# Patient Record
Sex: FEMALE | URBAN - METROPOLITAN AREA
[De-identification: names, ages, dates, MRNs, and addresses within clinical notes are randomized per-mention and may not be internally consistent; named-entity substitution may affect disease eponyms.]

---

## 2024-03-10 ENCOUNTER — APPOINTMENT (OUTPATIENT)
Dept: RADIOLOGY | Facility: MEDICAL CENTER | Age: 89
DRG: 065 | End: 2024-03-10
Attending: EMERGENCY MEDICINE
Payer: MEDICARE

## 2024-03-10 ENCOUNTER — HOSPITAL ENCOUNTER (INPATIENT)
Facility: MEDICAL CENTER | Age: 89
LOS: 5 days | DRG: 065 | End: 2024-03-15
Attending: EMERGENCY MEDICINE | Admitting: HOSPITALIST
Payer: MEDICARE

## 2024-03-10 DIAGNOSIS — R41.82 ALTERED MENTAL STATUS, UNSPECIFIED ALTERED MENTAL STATUS TYPE: ICD-10-CM

## 2024-03-10 DIAGNOSIS — I63.9 ISCHEMIC STROKE (HCC): ICD-10-CM

## 2024-03-10 DIAGNOSIS — R29.90 STROKE-LIKE SYMPTOMS: ICD-10-CM

## 2024-03-10 DIAGNOSIS — R53.1 RIGHT SIDED WEAKNESS: ICD-10-CM

## 2024-03-10 PROBLEM — E87.6 HYPOKALEMIA: Status: ACTIVE | Noted: 2024-03-10

## 2024-03-10 LAB
ABO + RH BLD: NORMAL
ABO GROUP BLD: ABNORMAL
ALBUMIN SERPL BCP-MCNC: 3.7 G/DL (ref 3.2–4.9)
ALBUMIN/GLOB SERPL: 1.5 G/DL
ALP SERPL-CCNC: 89 U/L (ref 30–99)
ALT SERPL-CCNC: 8 U/L (ref 2–50)
ANION GAP SERPL CALC-SCNC: 12 MMOL/L (ref 7–16)
APTT PPP: 26.6 SEC (ref 24.7–36)
AST SERPL-CCNC: 15 U/L (ref 12–45)
BASOPHILS # BLD AUTO: 0.7 % (ref 0–1.8)
BASOPHILS # BLD: 0.04 K/UL (ref 0–0.12)
BILIRUB SERPL-MCNC: 0.5 MG/DL (ref 0.1–1.5)
BLD GP AB INVEST PLASRBC-IMP: ABNORMAL
BLD GP AB SCN SERPL QL: ABNORMAL
BUN SERPL-MCNC: 14 MG/DL (ref 8–22)
CALCIUM ALBUM COR SERPL-MCNC: 8.1 MG/DL (ref 8.5–10.5)
CALCIUM SERPL-MCNC: 7.9 MG/DL (ref 8.5–10.5)
CHLORIDE SERPL-SCNC: 108 MMOL/L (ref 96–112)
CO2 SERPL-SCNC: 21 MMOL/L (ref 20–33)
CREAT SERPL-MCNC: 0.8 MG/DL (ref 0.5–1.4)
EOSINOPHIL # BLD AUTO: 0.12 K/UL (ref 0–0.51)
EOSINOPHIL NFR BLD: 2.2 % (ref 0–6.9)
ERYTHROCYTE [DISTWIDTH] IN BLOOD BY AUTOMATED COUNT: 43.4 FL (ref 35.9–50)
EST. AVERAGE GLUCOSE BLD GHB EST-MCNC: 108 MG/DL
GFR SERPLBLD CREATININE-BSD FMLA CKD-EPI: 70 ML/MIN/1.73 M 2
GLOBULIN SER CALC-MCNC: 2.5 G/DL (ref 1.9–3.5)
GLUCOSE SERPL-MCNC: 107 MG/DL (ref 65–99)
HBA1C MFR BLD: 5.4 % (ref 4–5.6)
HCT VFR BLD AUTO: 37.3 % (ref 37–47)
HGB BLD-MCNC: 13.5 G/DL (ref 12–16)
IMM GRANULOCYTES # BLD AUTO: 0.01 K/UL (ref 0–0.11)
IMM GRANULOCYTES NFR BLD AUTO: 0.2 % (ref 0–0.9)
INR PPP: 0.96 (ref 0.87–1.13)
LYMPHOCYTES # BLD AUTO: 1.02 K/UL (ref 1–4.8)
LYMPHOCYTES NFR BLD: 18.7 % (ref 22–41)
MCH RBC QN AUTO: 33.3 PG (ref 27–33)
MCHC RBC AUTO-ENTMCNC: 36.2 G/DL (ref 32.2–35.5)
MCV RBC AUTO: 92.1 FL (ref 81.4–97.8)
MONOCYTES # BLD AUTO: 0.37 K/UL (ref 0–0.85)
MONOCYTES NFR BLD AUTO: 6.8 % (ref 0–13.4)
NEUTROPHILS # BLD AUTO: 3.9 K/UL (ref 1.82–7.42)
NEUTROPHILS NFR BLD: 71.4 % (ref 44–72)
NRBC # BLD AUTO: 0 K/UL
NRBC BLD-RTO: 0 /100 WBC (ref 0–0.2)
PLATELET # BLD AUTO: 247 K/UL (ref 164–446)
PMV BLD AUTO: 8.4 FL (ref 9–12.9)
POTASSIUM SERPL-SCNC: 3.2 MMOL/L (ref 3.6–5.5)
PROT SERPL-MCNC: 6.2 G/DL (ref 6–8.2)
PROTHROMBIN TIME: 12.9 SEC (ref 12–14.6)
RBC # BLD AUTO: 4.05 M/UL (ref 4.2–5.4)
RH BLD: ABNORMAL
SODIUM SERPL-SCNC: 141 MMOL/L (ref 135–145)
TROPONIN T SERPL-MCNC: 13 NG/L (ref 6–19)
WBC # BLD AUTO: 5.5 K/UL (ref 4.8–10.8)

## 2024-03-10 PROCEDURE — 99223 1ST HOSP IP/OBS HIGH 75: CPT | Mod: AI | Performed by: HOSPITALIST

## 2024-03-10 PROCEDURE — 700105 HCHG RX REV CODE 258: Performed by: HOSPITALIST

## 2024-03-10 PROCEDURE — 86870 RBC ANTIBODY IDENTIFICATION: CPT

## 2024-03-10 PROCEDURE — 93005 ELECTROCARDIOGRAM TRACING: CPT | Performed by: EMERGENCY MEDICINE

## 2024-03-10 PROCEDURE — 85610 PROTHROMBIN TIME: CPT

## 2024-03-10 PROCEDURE — 94760 N-INVAS EAR/PLS OXIMETRY 1: CPT

## 2024-03-10 PROCEDURE — 71045 X-RAY EXAM CHEST 1 VIEW: CPT

## 2024-03-10 PROCEDURE — 36415 COLL VENOUS BLD VENIPUNCTURE: CPT

## 2024-03-10 PROCEDURE — 99285 EMERGENCY DEPT VISIT HI MDM: CPT

## 2024-03-10 PROCEDURE — 86900 BLOOD TYPING SEROLOGIC ABO: CPT

## 2024-03-10 PROCEDURE — 85025 COMPLETE CBC W/AUTO DIFF WBC: CPT

## 2024-03-10 PROCEDURE — 700117 HCHG RX CONTRAST REV CODE 255: Performed by: EMERGENCY MEDICINE

## 2024-03-10 PROCEDURE — 70498 CT ANGIOGRAPHY NECK: CPT

## 2024-03-10 PROCEDURE — 86850 RBC ANTIBODY SCREEN: CPT

## 2024-03-10 PROCEDURE — 0042T CT-CEREBRAL PERFUSION ANALYSIS: CPT

## 2024-03-10 PROCEDURE — 80053 COMPREHEN METABOLIC PANEL: CPT

## 2024-03-10 PROCEDURE — 86901 BLOOD TYPING SEROLOGIC RH(D): CPT

## 2024-03-10 PROCEDURE — 85730 THROMBOPLASTIN TIME PARTIAL: CPT

## 2024-03-10 PROCEDURE — 700111 HCHG RX REV CODE 636 W/ 250 OVERRIDE (IP): Performed by: HOSPITALIST

## 2024-03-10 PROCEDURE — 84484 ASSAY OF TROPONIN QUANT: CPT

## 2024-03-10 PROCEDURE — 70450 CT HEAD/BRAIN W/O DYE: CPT

## 2024-03-10 PROCEDURE — 70496 CT ANGIOGRAPHY HEAD: CPT

## 2024-03-10 PROCEDURE — 770020 HCHG ROOM/CARE - TELE (206)

## 2024-03-10 PROCEDURE — 83036 HEMOGLOBIN GLYCOSYLATED A1C: CPT

## 2024-03-10 RX ORDER — ATORVASTATIN CALCIUM 40 MG/1
40 TABLET, FILM COATED ORAL EVERY EVENING
Status: DISCONTINUED | OUTPATIENT
Start: 2024-03-10 | End: 2024-03-15 | Stop reason: HOSPADM

## 2024-03-10 RX ORDER — ACETAMINOPHEN 325 MG/1
650 TABLET ORAL EVERY 6 HOURS PRN
Status: DISCONTINUED | OUTPATIENT
Start: 2024-03-10 | End: 2024-03-15 | Stop reason: HOSPADM

## 2024-03-10 RX ADMIN — POTASSIUM CHLORIDE: 2 INJECTION, SOLUTION, CONCENTRATE INTRAVENOUS at 15:38

## 2024-03-10 RX ADMIN — IOHEXOL 80 ML: 350 INJECTION, SOLUTION INTRAVENOUS at 12:05

## 2024-03-10 RX ADMIN — IOHEXOL 40 ML: 350 INJECTION, SOLUTION INTRAVENOUS at 12:05

## 2024-03-10 ASSESSMENT — PAIN DESCRIPTION - PAIN TYPE
TYPE: ACUTE PAIN
TYPE: ACUTE PAIN

## 2024-03-10 NOTE — ED TRIAGE NOTES
Keyla Sanchez  89 y.o.  female  Chief Complaint   Patient presents with    Possible Stroke     Last known well at 8PM last night when her daughter checked on her. No thinners. . Pt's daughter came to see her at 9AM & found pt in bed, not responding normally verbally, weak on R side. Pt is baseline A & O x4, ambulatory, independent. Initial NIH 7.     Pt BIB EMS from home. Hx stroke with L eye visual changes as only deficit. Direct to CT, Stroke IR protocol per ERP.

## 2024-03-10 NOTE — ASSESSMENT & PLAN NOTE
With right hemiparesis and no evidence of seizure to suggest Librado's Paralysis  CT, CTA, perfusion study negative  MRI brain ordered.  Refrain from antiplatelets given the risk of hemorrhagic conversion  Therapies ordered  Tele monitoring to eval for afib  Await a call back from her daughter to discuss history and advanced directives and tube feeding.  Neurology consult once her daughter has been contacted to discuss her wishes.  Prognosis is poor given the significant deficits and advanced age.    MRI of the brain is reviewed, discussed with radiology discussed with neurology team, had discussion with patient's family regarding imaging and prognosis, neurologist recommending aspirin and Plavix and statin, patient unfortunately is not safe to swallow, discussed with patient's family regarding tube feeding, as per patient's daughter she is going to call rest of the family and discuss case they are considering comfort care/hospice.      03/13/24  Patient's family presenting with hospice for home versus group home.  Continue to be n.p.o. for ice chips  Discussed the risk of aspiration with meeting with the patient's family at bedside.  Once proceeding with comfort care measures and hospice, we will allow the patient to eat in anticipation that the patient will aspirate.  Speech therapy can recommend diet to minimize this risk.    03/14/24  Diet advanced following F EES study.

## 2024-03-10 NOTE — ED PROVIDER NOTES
"Emergency Physician Note    Chief Concern:  Chief Complaint   Patient presents with    Possible Stroke     Last known well at 8PM last night when her daughter checked on her. No thinners. . Pt's daughter came to see her at 9AM & found pt in bed, not responding normally verbally, weak on R side. Pt is baseline A & O x4, ambulatory, independent. Initial NIH 6.         Limitation to History:  Altered mental status    HPI/ROS   Outside Historians:   Transporting paramedics provide full history     External Records Reviewed:   No prior medical records available for review    HPI:  Keyla Sanchez is a 89 y.o. female who presents to the emergency department today for a stroke assessment.  I was called to evaluate the patient at triage out of concern for stroke.  Transporting paramedics report that she is 89 years old, lives independently alone at home with family checking on her twice a day.  She was last seen normal around 8 PM, 15 hours prior to arrival.  When family stopped by to check on her today they found she was still in bed which is unusual for her.  She was not able to stand, and appeared to be weak and less responsive than usual.  Paramedics report that she does have right-sided weakness which is not present at baseline, and may have some dysarthria.  She has a prior history of CVA about a year ago, as reported by paramedics, only residual deficit is related to visual fields.    PAST MEDICAL HISTORY  No past medical history on file.    SURGICAL HISTORY  No past surgical history on file.    FAMILY HISTORY  No family history on file.    SOCIAL HISTORY       CURRENT MEDICATIONS  Previous Medications    No medications on file       ALLERGIES  Patient has no allergy information on record.    PHYSICAL EXAM  Vital Signs: BP (!) 201/115   Pulse 90   Temp 36.1 °C (97 °F) (Temporal)   Resp 16   Ht 1.651 m (5' 5\")   Wt 59 kg (130 lb)   SpO2 96%   BMI 21.63 kg/m²   Constitutional: Alert, no acute distress  HENT: " Normocephalic, atraumatic, no facial abrasions, no facial edema  Cardiovascular: No tachycardia, regular rhythm  Pulmonary: No respiratory distress, normal work of breathing, breath sounds quiet and equal bilaterally  Abdomen: Soft, non tender, no peritoneal signs.  Skin: Warm, dry, no rashes or lesions  Musculoskeletal: Normal range of motion in all extremities, no swelling or deformity noted  Neurologic: No facial asymmetry, very minimally slurred speech, easily follows commands, able to name objects, is uncertain of the month, but knows her age, muscle strength 5/5 in left upper and left lower extremities, 3/5 in right upper extremity and right lower extremity, normal  strength bilaterally, sensation grossly intact, finger-to-nose testing limited by right upper extremity weakness, visual field testing limited by residual visual field deficit from prior CVA NIHSS: 7    Diagnostic Studies & Procedures    Labs:  All labs reviewed by me as noted below.    EK Lead EKG interpreted by me as noted below  Results for orders placed or performed during the hospital encounter of 03/10/24   EKG (NOW)   Result Value Ref Range    Report       [facility] Emergency Dept.    Test Date:  2024-03-10  Pt Name:    PEARL SARMIENTO                Department: ER  MRN:        [id number]                      Room:       S1  Gender:     F                            Technician: [id number]  :        [id number]                   Requested By:ER TRIAGE PROTOCOL  Order #:    [id number]                    Reading MD: RADHA CHRISTY MD    Measurements  Intervals                                Axis  Rate:       86                           P:          -1  CO:         160                          QRS:        -33  QRSD:       90                           T:          101  QT:         405  QTc:        485    Interpretive Statements  Rate 86, sinus rhythm, no ST elevation or depression, no pathologic T wave  inversions, normal  intervals  Electronically Signed On 03- 13:24:24 PDT by RADHA CHRISTY MD         Radiology:  The attending Emergency Physician has independently interpreted the following imaging:  I independently reviewed the CT imaging of the head, no large acute bleed identified.    CT-CTA NECK WITH & W/O-POST PROCESSING   Final Result      1.  Atherosclerotic plaque involving the carotid bifurcations bilaterally. This results in less than 50% diameter narrowing.      2.  Ectasia of the visualized intracranial portions of the vertebral, basilar and internal carotid arteries.      3.  No evidence of carotid or vertebral arterial occlusion or dissection.      CT-CTA HEAD WITH & W/O-POST PROCESS   Final Result      1.  No acute appearing abnormality detected. No large vessel occlusion detected.   2.  Atherosclerotic vascular disease of intracranial arteries which is detailed above. This is most notable for moderate stenoses in the right anterior cerebral artery.   3.  There is fusiform dilation of the left carotid terminus up to 6 mm in diameter although there is no saccular aneurysm detected.      CT-CEREBRAL PERFUSION ANALYSIS   Final Result      No cerebral perfusion anomaly detected.      1.  Cerebral blood flow less than 30% likely representing completed infarct = 0 mL.      2.  T Max more than 6 seconds likely representing combination of completed infarct and ischemia = 0 mL.      3.  Mismatched volume likely representing ischemic brain/penumbra = None      4.  Please note that the cerebral perfusion was performed on the limited brain tissue around the basal ganglia region. Infarct/ischemia outside the CT perfusion sections can be missed in this study.      CT-HEAD W/O   Final Result      1.  No evidence of acute intracranial process.      2.  Cerebral atrophy as well as periventricular chronic small vessel ischemic change.         DX-CHEST-PORTABLE (1 VIEW)    (Results Pending)       Course and Medical Decision  Making    Initial Assessment and Plan:  Ms. Sanchez presents to the emergency department today as a stroke assessment, she has profound right-sided weakness.  On arrival NIH stroke scale was 7.  Last known well time was last night, outside of the window for alteplase, for this reason there was no stroke activation called on the patient's arrival.  She was sent for expedited imaging which did not show a large vessel occlusion amenable to thrombectomy, no indication for escalation to stroke IR team.    Screening lab work does not show any acute abnormalities, she is mildly hypertensive on arrival to the emergency department.  Glucose is within normal limits, no evidence of hypoglycemia.    She is admitted to hospitalist service for further evaluation including MRI.  At this time, she does not have family in the emergency department to provide further history, or discuss goals of care.    Additional Problems and Disposition    I have discussed management of the patient with the following physicians:   Dr. Lamar. Hospitalist    Disposition:  Admit in guarded condition    FINAL IMPRESSION   1. Right sided weakness    2. Altered mental status, unspecified altered mental status type    3. Stroke-like symptoms

## 2024-03-10 NOTE — H&P
Hospital Medicine History & Physical Note    Date of Service  3/10/2024    Primary Care Physician  No primary care provider on file.    Consultants  none    Code Status  Full Code    Chief Complaint  Chief Complaint   Patient presents with    Possible Stroke     Last known well at 8PM last night when her daughter checked on her. No thinners. . Pt's daughter came to see her at 9AM & found pt in bed, not responding normally verbally, weak on R side. Pt is baseline A & O x4, ambulatory, independent. Initial NIH 6.       History of Presenting Illness  Keyla Sanchez is a 89 y.o. female who presented 3/10/2024 with right sided paralysis.  Ms. Sanchez apparently has a past medical history of prior stroke and hypertension though details are unknown that was found by her daughter this morning with decreased level of consciousness and right-sided weakness.  Her last known well was 8 PM the night prior.  She is brought to the emergency room where CT of the head, CTA of the head and neck and CT perfusion are all negative for acute processes.  She is verbal though oriented only to name though not age nor time nor event.  She has a significant right hemiparesis.  Her blood pressure is elevated in the emergency room at 180/128.  She does not appear safe to swallow at this time and a high aspiration risk.  There are no friends nor family to corroborate her history present illness nor past medical history nor advanced directives.    I discussed the plan of care with Dr. Felder.  I have called her daughter Radha Rajan [phone] and left a message with her to call back.    Review of Systems  Review of Systems   Unable to perform ROS: Acuity of condition       Past Medical History  This cannot be obtained but apparently she has a history of a prior stroke a year ago and hypertension this is via a small note that was left in the emergency room otherwise we have no records    Surgical History  This cannot be obtained    Family  "History  This cannot be obtained    Social History   Reportedly she lives alone otherwise details are unknown    Allergies  No Known Allergies    Medications  Home medications are unknown       Physical Exam  Temp:  [36.1 °C (97 °F)] 36.1 °C (97 °F)  Pulse:  [84-90] 84  Resp:  [16] 16  BP: (180-201)/(101-128) 180/128  SpO2:  [95 %-96 %] 95 %  Blood Pressure : (!) 180/128   Temperature: 36.1 °C (97 °F)   Pulse: 84   Respiration: 16   Pulse Oximetry: 95 %       Physical Exam  Vitals and nursing note reviewed.   Constitutional:       Appearance: She is ill-appearing.   Cardiovascular:      Rate and Rhythm: Normal rate and regular rhythm.   Pulmonary:      Effort: Pulmonary effort is normal.      Breath sounds: Normal breath sounds.   Abdominal:      General: There is no distension.      Tenderness: There is no abdominal tenderness.   Musculoskeletal:      Right lower leg: No edema.      Left lower leg: No edema.   Neurological:      Mental Status: She is alert.      Comments: Mild right facial droop, she has slurred speech though it is intelligible, she knows her name but does not know how old she is does not know where we are does not know the year  Dense right arm paralysis  She has some movement in the right quad but no movement in the right leg below the knee         Laboratory:  Recent Labs     03/10/24  1155   WBC 5.5   RBC 4.05*   HEMOGLOBIN 13.5   HEMATOCRIT 37.3   MCV 92.1   MCH 33.3*   MCHC 36.2*   RDW 43.4   PLATELETCT 247   MPV 8.4*     Recent Labs     03/10/24  1155   SODIUM 141   POTASSIUM 3.2*   CHLORIDE 108   CO2 21   GLUCOSE 107*   BUN 14   CREATININE 0.80   CALCIUM 7.9*     Recent Labs     03/10/24  1155   ALTSGPT 8   ASTSGOT 15   ALKPHOSPHAT 89   TBILIRUBIN 0.5   GLUCOSE 107*     Recent Labs     03/10/24  1155   APTT 26.6   INR 0.96     No results for input(s): \"NTPROBNP\" in the last 72 hours.      Recent Labs     03/10/24  1155   TROPONINT 13       Imaging:  DX-CHEST-PORTABLE (1 VIEW)   Final Result "      Cardiomegaly.      CT-CTA NECK WITH & W/O-POST PROCESSING   Final Result      1.  Atherosclerotic plaque involving the carotid bifurcations bilaterally. This results in less than 50% diameter narrowing.      2.  Ectasia of the visualized intracranial portions of the vertebral, basilar and internal carotid arteries.      3.  No evidence of carotid or vertebral arterial occlusion or dissection.      CT-CTA HEAD WITH & W/O-POST PROCESS   Final Result      1.  No acute appearing abnormality detected. No large vessel occlusion detected.   2.  Atherosclerotic vascular disease of intracranial arteries which is detailed above. This is most notable for moderate stenoses in the right anterior cerebral artery.   3.  There is fusiform dilation of the left carotid terminus up to 6 mm in diameter although there is no saccular aneurysm detected.      CT-CEREBRAL PERFUSION ANALYSIS   Final Result      No cerebral perfusion anomaly detected.      1.  Cerebral blood flow less than 30% likely representing completed infarct = 0 mL.      2.  T Max more than 6 seconds likely representing combination of completed infarct and ischemia = 0 mL.      3.  Mismatched volume likely representing ischemic brain/penumbra = None      4.  Please note that the cerebral perfusion was performed on the limited brain tissue around the basal ganglia region. Infarct/ischemia outside the CT perfusion sections can be missed in this study.      CT-HEAD W/O   Final Result      1.  No evidence of acute intracranial process.      2.  Cerebral atrophy as well as periventricular chronic small vessel ischemic change.         MR-BRAIN-W/O    (Results Pending)     EKG interpreted by me sinus rhythm Q waves anteriorly normal QT interval      Assessment/Plan:  Justification for Admission Status  I anticipate this patient will require at least two midnights for appropriate medical management, necessitating inpatient admission because workup and treatment of dense  ischemic stroke with hemiparesis    Patient will need a Telemetry bed on MEDICAL service .  The need is secondary to evaluate for occult atrial fibrillation.    * Ischemic stroke (HCC)- (present on admission)  Assessment & Plan  With right hemiparesis and no evidence of seizure to suggest Librado's Paralysis  CT, CTA, perfusion study negative  MRI brain ordered.  Refrain from antiplatelets given the risk of hemorrhagic conversion  Therapies ordered  Tele monitoring to eval for afib  Await a call back from her daughter to discuss history and advanced directives and tube feeding.  Neurology consult once her daughter has been contacted to discuss her wishes.  Prognosis is poor given the significant deficits and advanced age.        Hypokalemia- (present on admission)  Assessment & Plan  Potassium is low at 3.2 and will be added to IV fluids  Check BMP in the morning        VTE prophylaxis: SCDs/TEDs

## 2024-03-10 NOTE — ED NOTES
Bedside report received from off going RN/tech: Lisa, assumed care of patient.  POC discussed with patient. Call light within reach, all needs addressed at this time.       Fall risk interventions in place: Place socks on patient, Keep floor surfaces clean and dry, and Bed Alarm in use (all applicable per [place] Fall risk assessment)   Continuous monitoring: Cardiac Leads, Pulse Ox, or Blood Pressure  IVF/IV medications: Not Applicable   Oxygen: Room Air  Bedside sitter: Not Applicable   Isolation: Not Applicable

## 2024-03-10 NOTE — ASSESSMENT & PLAN NOTE
Potassium is low at 3.2 and will be added to IV fluids  Check BMP in the morning    03/13/24  Continuing potassium infusion    03/14/24  Discontinue potassium supplementation

## 2024-03-11 ENCOUNTER — APPOINTMENT (OUTPATIENT)
Dept: RADIOLOGY | Facility: MEDICAL CENTER | Age: 89
DRG: 065 | End: 2024-03-11
Attending: HOSPITALIST
Payer: MEDICARE

## 2024-03-11 ENCOUNTER — HOSPICE ADMISSION (OUTPATIENT)
Dept: HOSPICE | Facility: HOSPICE | Age: 89
End: 2024-03-11
Payer: MEDICARE

## 2024-03-11 PROBLEM — I16.0 HYPERTENSIVE URGENCY: Status: ACTIVE | Noted: 2024-03-11

## 2024-03-11 LAB
CHOLEST SERPL-MCNC: 253 MG/DL (ref 100–199)
EKG IMPRESSION: NORMAL
GLUCOSE BLD STRIP.AUTO-MCNC: 120 MG/DL (ref 65–99)
HDLC SERPL-MCNC: 67 MG/DL
LDLC SERPL CALC-MCNC: 156 MG/DL
TRIGL SERPL-MCNC: 148 MG/DL (ref 0–149)

## 2024-03-11 PROCEDURE — 70551 MRI BRAIN STEM W/O DYE: CPT

## 2024-03-11 PROCEDURE — 36415 COLL VENOUS BLD VENIPUNCTURE: CPT

## 2024-03-11 PROCEDURE — 80061 LIPID PANEL: CPT

## 2024-03-11 PROCEDURE — 92610 EVALUATE SWALLOWING FUNCTION: CPT

## 2024-03-11 PROCEDURE — G0316 PR PROLONGED IP/OBS E&M EA 15 MIN: HCPCS | Performed by: HOSPITALIST

## 2024-03-11 PROCEDURE — A9270 NON-COVERED ITEM OR SERVICE: HCPCS | Performed by: HOSPITALIST

## 2024-03-11 PROCEDURE — 700105 HCHG RX REV CODE 258: Performed by: HOSPITALIST

## 2024-03-11 PROCEDURE — 770020 HCHG ROOM/CARE - TELE (206)

## 2024-03-11 PROCEDURE — 700102 HCHG RX REV CODE 250 W/ 637 OVERRIDE(OP): Performed by: HOSPITALIST

## 2024-03-11 PROCEDURE — 97602 WOUND(S) CARE NON-SELECTIVE: CPT

## 2024-03-11 PROCEDURE — 82962 GLUCOSE BLOOD TEST: CPT

## 2024-03-11 PROCEDURE — 99223 1ST HOSP IP/OBS HIGH 75: CPT | Performed by: NURSE PRACTITIONER

## 2024-03-11 PROCEDURE — 99222 1ST HOSP IP/OBS MODERATE 55: CPT | Performed by: PHYSICAL MEDICINE & REHABILITATION

## 2024-03-11 PROCEDURE — 700111 HCHG RX REV CODE 636 W/ 250 OVERRIDE (IP): Performed by: HOSPITALIST

## 2024-03-11 PROCEDURE — 99233 SBSQ HOSP IP/OBS HIGH 50: CPT | Mod: 25 | Performed by: HOSPITALIST

## 2024-03-11 RX ORDER — LABETALOL HYDROCHLORIDE 5 MG/ML
10 INJECTION, SOLUTION INTRAVENOUS EVERY 6 HOURS PRN
Status: DISCONTINUED | OUTPATIENT
Start: 2024-03-11 | End: 2024-03-15 | Stop reason: HOSPADM

## 2024-03-11 RX ORDER — AMLODIPINE BESYLATE 5 MG/1
5 TABLET ORAL
Status: DISCONTINUED | OUTPATIENT
Start: 2024-03-11 | End: 2024-03-11

## 2024-03-11 RX ORDER — ASPIRIN 300 MG/1
300 SUPPOSITORY RECTAL DAILY
Status: DISCONTINUED | OUTPATIENT
Start: 2024-03-11 | End: 2024-03-15 | Stop reason: HOSPADM

## 2024-03-11 RX ORDER — HYDRALAZINE HYDROCHLORIDE 20 MG/ML
10 INJECTION INTRAMUSCULAR; INTRAVENOUS EVERY 6 HOURS PRN
Status: DISCONTINUED | OUTPATIENT
Start: 2024-03-11 | End: 2024-03-15 | Stop reason: HOSPADM

## 2024-03-11 RX ORDER — AMLODIPINE BESYLATE 10 MG/1
10 TABLET ORAL DAILY
COMMUNITY
End: 2024-03-16

## 2024-03-11 RX ADMIN — POTASSIUM CHLORIDE: 2 INJECTION, SOLUTION, CONCENTRATE INTRAVENOUS at 02:47

## 2024-03-11 RX ADMIN — HYDRALAZINE HYDROCHLORIDE 10 MG: 20 INJECTION, SOLUTION INTRAMUSCULAR; INTRAVENOUS at 19:26

## 2024-03-11 RX ADMIN — POTASSIUM CHLORIDE: 2 INJECTION, SOLUTION, CONCENTRATE INTRAVENOUS at 17:21

## 2024-03-11 RX ADMIN — LABETALOL HYDROCHLORIDE 10 MG: 5 INJECTION INTRAVENOUS at 14:53

## 2024-03-11 RX ADMIN — HYDRALAZINE HYDROCHLORIDE 10 MG: 20 INJECTION, SOLUTION INTRAMUSCULAR; INTRAVENOUS at 14:06

## 2024-03-11 RX ADMIN — ASPIRIN 300 MG: 300 SUPPOSITORY RECTAL at 09:15

## 2024-03-11 RX ADMIN — LABETALOL HYDROCHLORIDE 10 MG: 5 INJECTION INTRAVENOUS at 20:54

## 2024-03-11 ASSESSMENT — PAIN DESCRIPTION - PAIN TYPE
TYPE: ACUTE PAIN
TYPE: ACUTE PAIN

## 2024-03-11 ASSESSMENT — FIBROSIS 4 INDEX: FIB4 SCORE: 1.95

## 2024-03-11 NOTE — PROGRESS NOTES
1200 neuro check: At first pt. is difficult to arouse and when pt. aroused she was unable to verbalize-weakening in left arm and leg noted. MD notified. Rapid response not called d/t poor patient prognosis; palliative consult is pending.  Will continue to monitor.

## 2024-03-11 NOTE — PROGRESS NOTES
Monitor summary: SR 79-97, WI -0.17, QRS -0.08, QT -0.36, with rare PACs and PVCs per strip from the monitor room.

## 2024-03-11 NOTE — THERAPY
Physical Therapy Contact Note    Patient Name: Keyla Sanchez  Age:  89 y.o., Sex:  female  Medical Record #: [id number]  Today's Date: 3/11/2024       03/11/24 0844   Interdisciplinary Plan of Care Collaboration   IDT Collaboration with  Nursing   Collaboration Comments PT consult received. Attempted to see pt for PT evaluation, however RN was in process of calling Rapid Response due to new neurological symptoms. Will hold and attempt when appropriate and able.   Session Information   Date / Session Number  3/11- needs SAMIRAL

## 2024-03-11 NOTE — ASSESSMENT & PLAN NOTE
Discussed with neuro no indication for permissive htn  I have ordered iv hydralazine and labetalol, monitoring for side effects like hypotension bradycardia  Discussed with bedside nurse.     Patient is still requiring iv labetalol and hydralazine to control her hypertension, bp 202/118  Monitoring for side effects including but not limited to hypotension / bradycardia.     03/14/24  Continuing elevation in blood pressures.  Resume amlodipine 5 mg daily

## 2024-03-11 NOTE — CONSULTS
Physical Medicine and Rehabilitation Consultation          Date of initial consultation: 3/11/2024  Consulting provider: Brenden Lamar M.D.   Reason for consultation: assess for acute inpatient rehab appropriateness  LOS: 1 Day(s)    Chief complaint: Stroke     HPI: The patient is a 89 y.o. right hand dominant female with a past medical history of hypertension, hyperlipidemia, prior CVA;  who presented on 3/10/2024 11:51 AM with right hemiplegia and altered mental status.  Patient was found down by daughter who activated EMS.  Patient's blood pressure was 180/128 on presentation.  NIH score 15.  CT head was negative for acute intracranial abnormalities.  CTA showed no LVO.  CT perfusion showed no mismatch.  MR brain found left posterior limb internal capsule acute infarct    The patient currently reports right-sided weakness.  Patient has facial droop, likely has right-sided visual cut.  Patient's daughter is at bedside she tells me that patient was very sedentary prior to this.  She could not get her to even walk up and down the block.  Patient is DNR/DNI, and would not want alternative means of feeding.  Family is exploring comfort care options.    ROS  Pertinent positives are mentioned in the HPI, all others reviewed and are negative.    Social Hx:  1 SH  1 BLAYNE  With: Alone    THERAPY:  Restrictions: Fall risk, swallow precautions  PT: Functional mobility   Pending    OT: ADLs  Pending    SLP:   3/11: N.p.o. due to decreased mentation/altered mental status    IMAGING:  MR brain 3/11/2024    1. Age-related cerebral atrophy.  2. Extensive periventricular and pontine white matter changes consistent with chronic microvascular ischemic gliosis.  3. Acute bandlike area of acute infarction involving the posterior limb of the left internal capsule.  4. Multifocal areas of chronic lacunar type infarction involving the basal ganglia and right frontal periventricular white matter.  5. Marked chronic ectasia of the  "cavernous and supraclinoid internal carotid arteries as well as the proximal M1 segments and the distal vertebral basilar system.    PROCEDURES:  None      PMH:  No past medical history on file.    PSH:  No past surgical history on file.    FHX:  Non-pertinent to today's issues    Medications:  Current Facility-Administered Medications   Medication Dose    aspirin (Asa) suppository 300 mg  300 mg    hydrALAZINE (Apresoline) injection 10 mg  10 mg    potassium chloride 20 mEq in LR 1,000 mL infusion      acetaminophen (Tylenol) tablet 650 mg  650 mg    atorvastatin (Lipitor) tablet 40 mg  40 mg       Allergies:  No Known Allergies      Physical Exam:  Vitals: BP (!) 167/110   Pulse 77   Temp 36.3 °C (97.3 °F) (Temporal)   Resp 16   Ht 1.651 m (5' 5\")   Wt 60 kg (132 lb 4.4 oz)   SpO2 97%   Gen: NAD  Head:  NC/AT  Eyes/ Nose/ Mouth: PERRLA, moist mucous membranes  Cardio: RRR, good distal perfusion, warm extremities  Pulm: normal respiratory effort, no cyanosis   Abd: Soft NTND, negative borborygmi   Ext: No peripheral edema. No calf tenderness. No clubbing.    Mental status: follows commands  Speech: Minimal speech output    Motor:      Upper Extremity  Myotome R L   Shoulder flexion C5 0/5 5   Elbow flexion C5 0/5 5   Wrist extension C6 0/5 5   Elbow extension C7 0/5 5   Finger flexion C8 0/5 5   Finger abduction T1 0/5 5     Lower Extremity Myotome R L   Hip flexion L2 0/5 5   Knee extension L3 0/5 5   Ankle dorsiflexion L4 0/5 5   Toe extension L5 0/5 5   Ankle plantarflexion S1 0/5 5     Labs: Reviewed and significant for   Recent Labs     03/10/24  1155   RBC 4.05*   HEMOGLOBIN 13.5   HEMATOCRIT 37.3   PLATELETCT 247   PROTHROMBTM 12.9   APTT 26.6   INR 0.96     Recent Labs     03/10/24  1155   SODIUM 141   POTASSIUM 3.2*   CHLORIDE 108   CO2 21   GLUCOSE 107*   BUN 14   CREATININE 0.80   CALCIUM 7.9*     Recent Results (from the past 24 hour(s))   EKG (NOW)    Collection Time: 03/10/24 12:42 PM   Result " Value Ref Range    Report       [facility]    Test Date:  2024-03-10  Pt Name:    FRANCOIS SARMIENTO                 Department: ER  MRN:        [id number]                      Room:       BL 14  Gender:     Female                       Technician: [id number]  :        [id number]                   Requested By:ER TRIAGE PROTOCOL  Order #:    [id number]                    Reading MD:    Measurements  Intervals                                Axis  Rate:       86                           P:          -1  KS:         160                          QRS:        -33  QRSD:       90                           T:          101  QT:         405  QTc:        485    Interpretive Statements  Sinus rhythm  Anterior infarct, old  No previous ECG available for comparison     Lipid Profile    Collection Time: 24 12:54 AM   Result Value Ref Range    Cholesterol,Tot 253 (H) 100 - 199 mg/dL    Triglycerides 148 0 - 149 mg/dL    HDL 67 >=40 mg/dL     (H) <100 mg/dL         ASSESSMENT:  Patient is a 89 y.o. female admitted with dense right hemiplegia, found to have left PLIC stroke    Rehabilitation: Impaired ADLs and mobility  Patient is not a candidate for IPR due to family wishes to pursue comfort care    All cases are subject to administrative review and recommendations may change    Disposition recommendations:  -Case discussed with family and Dr. Sevilla.  At her current level, patient will require 2 person assist .  This is not available from patient's family members.  We discussed potential routes of care involving IV hydration and feeding tube.   -No role for rehab  -PMR will sign off, please reconsult or reach out via Voalte if further evaluation or medical management is requested    Medical Complexity:    Posterior limb internal capsule stroke  -Patient has dense right hemiplegia  -Patient wishes to forego parenteral feeding  -Family pursuing comfort care, this will likely need hospice  at a SNF or other facility.  -Patient will require 2 person 24/7 care at home which is not available  -Patient is high risk for aspiration pneumonia, however with current goals of care it is acceptable to continue to have her eat a regular diet  -No role for rehab at this time  -PMR will sign off, please reconsult or reach out via Voalte if further evaluation or medical management is requested      Thank you for allowing us to participate in the care of this patient.     Patient was seen for >60 minutes on unit/floor of which > 50% of time was spent on counseling and coordination of care regarding the above, including prognosis, risk reduction, benefits of treatment, and options for next stage of care.    Stephane Ernandez, DO   Physical Medicine and Rehabilitation     Please note that this dictation was created using voice recognition software. I have made every reasonable attempt to correct obvious errors, but there may be errors of grammar and possibly content that I did not discover before finalizing the note.

## 2024-03-11 NOTE — HOSPITAL COURSE
Keyla Sanchez is a 89 y.o. female who presented 3/10/2024 with right sided paralysis.  Ms. Sanchez apparently has a past medical history of prior stroke and hypertension though details are unknown that was found by her daughter this morning with decreased level of consciousness and right-sided weakness.  Her last known well was 8 PM the night prior.  She is brought to the emergency room where CT of the head, CTA of the head and neck and CT perfusion are all negative for acute processes.  She is verbal though oriented only to name though not age nor time nor event.  She has a significant right hemiparesis.  Her blood pressure is elevated in the emergency room at 180/128.  She does not appear safe to swallow at this time and a high aspiration risk.  There are no friends nor family to corroborate her history present illness nor past medical history nor advanced directives.     I discussed the plan of care with Dr. Felder.  I have called her daughter Radha Rajan [phone] and left a message with her to call back.

## 2024-03-11 NOTE — PROGRESS NOTES
Med rec is complete per Pt's Daughter.   Daughter is unsure of Plavix strength.  Allergies reviewed.    Has patient had any outside antibiotics in the last 30 days? N    Any Anticoagulants (rivaroxaban, apixaban, edoxaban, dabigatran, warfarin, enoxaparin) taken in the last 14 days? N           Pharmacy/Pharmacies Pt utilizes : [place] [phone]

## 2024-03-11 NOTE — PROGRESS NOTES
Monitor summary: SR 92-94, VT 0.18, QRS 0.05, QT 0.41, with rare PACs per strip from monitor room.

## 2024-03-11 NOTE — DISCHARGE PLANNING
Case Management Discharge Planning    Admission Date: 3/10/2024  GMLOS: 2.9  ALOS: 1    6-Clicks ADL Score:    6-Clicks Mobility Score:        Anticipated Discharge Dispo: Discharge Disposition: D/T to hospice home (50)    DME Needed: No    Action(s) Taken: DC Assessment Complete (See below), Choice obtained, and Referral(s) sent    Escalations Completed: None    Medically Clear: No    Next Steps: f/u with pt and medical team to discuss dc needs and barriers.       Barriers to Discharge: Medical clearance and Outpatient referrals pending    Is the patient up for discharge tomorrow: No      Care Transition Team Assessment      RN NADEGE spoke to Pt's daughter Radha at bedside to obtain assessment information. Pt unable to assess. Pt lives alone independently in a 2 story house in [facility]  but been staying in the ground floor. Physical address is [address] [place] [id number].   PCP is Dr. Anna Schwab.    COLE LIGHT discussed with Radha the discharge plan for Pt. Radha is agreeable with hospice and would like to see if Pt can qualify for [facility] as Pt cannot stay in her house due to nobody can give her 24/7 care. Hospice choice obtained and completed. Faxed to [facility] for processing. Josesito from [facility] informed.     Information Source  Orientation Level: Unable to assess  Information Given By: Other (Comments) (daughter)  Informant's Name: Radha Rajan  Who is responsible for making decisions for patient? : Legal next of kin  Name(s) of Primary Decision Maker: Radha Mohini    Readmission Evaluation  Is this a readmission?: No    Elopement Risk  Legal Hold: No  Ambulatory or Self Mobile in Wheelchair: No-Not an Elopement Risk  Elopement Risk: Not at Risk for Elopement    Interdisciplinary Discharge Planning  Lives with - Patient's Self Care Capacity: Unable To Determine At This Time  Prior Services: Unable To Determine At This Time    Discharge Preparedness  What is your plan after discharge?: Other (comment)  (Hospice/ Home)  What are your discharge supports?: Child  Prior Functional Level: Ambulatory, Independent with Activities of Daily Living  Difficulity with ADLs: None  Difficulity with IADLs: Driving, Managing medication    Functional Assesment  Prior Functional Level: Ambulatory, Independent with Activities of Daily Living    Finances  Financial Barriers to Discharge: No  Prescription Coverage: Yes    Vision / Hearing Impairment  Left Eye Vision: Impaired              Domestic Abuse  Have you ever been the victim of abuse or violence?: No    Psychological Assessment  History of Substance Abuse: None         Anticipated Discharge Information  Discharge Disposition: D/T to hospice home (50)

## 2024-03-11 NOTE — WOUND TEAM
[facility]  Inpatient Services  Wound and Skin Care Brief Evaluation    Admission Date: 3/10/2024     Last order of IP CONSULT TO WOUND CARE was found on 3/10/2024 from Hospital Encounter on 3/10/2024     HPI, PMH, SH: Reviewed    Chief Complaint   Patient presents with    Possible Stroke     Last known well at 8PM last night when her daughter checked on her. No thinners. . Pt's daughter came to see her at 9AM & found pt in bed, not responding normally verbally, weak on R side. Pt is baseline A & O x4, ambulatory, independent. Initial NIH 6.     Diagnosis: Ischemic stroke (HCC) [I63.9]    Unit where seen by Wound Team: S195/02     Wound consult placed regarding Sacrum. Chart and images reviewed. This discussed with bedside RN Chari. This clinician in to assess patient. Patient pleasant and agreeable. Sacrum and bilateral heels. Non-selectively debrided with Moist warm washcloth.     No pressure injuries or advanced wound care needs identified. Wound consult completed. No further follow up unless indicated and consulted.                      Skin is intact   PREVENTATIVE INTERVENTIONS:    Q shift Evan - performed per nursing policy  Q shift pressure point assessments - performed per nursing policy    Surface/Positioning  Standard/trauma mattress - Currently in Place  Reposition q 2 hours - Currently in Place  TAPs Turning system - Ordered    Offloading/Redistribution  Heel offloading dressing (Silicone dressing) - Ordered      Containment/Moisture Prevention    Purwick/Condom Cath - Currently in Place  Barrier wipes - Ordered  Barrier paste - Applied this Visit    Mobilization      Not Mobilizing

## 2024-03-11 NOTE — CODE DOCUMENTATION
MD Sevilla @ bedside, Neuro ICU RN Blessing completing assessment. MD reaching out to neuro in regards to MRI result.

## 2024-03-11 NOTE — THERAPY
Occupational Therapy Contact Note    Patient Name: Keyla Sanchez  Age:  89 y.o., Sex:  female  Medical Record #: [id number]  Today's Date: 3/11/2024    OT consult rec'd. Attempted to see pt for OT eval, but currently in Rapid Response. Will hold and will re-attempt as appropriate/able.

## 2024-03-11 NOTE — THERAPY
Speech Language Pathology   Clinical Swallow Evaluation     Patient Name: Keyla Sanchez  AGE:  89 y.o., SEX:  female  Medical Record #: [id number]  Date of Service: 3/11/2024      History of Present Illness  89 y.o. female who presented 3/10/2024 with right sided paralysis.    PMHx stroke, HTN  No previous h/o SLP services at [facility]    CMHx Ischemic stroke, hypokalemia    CXR 3/10/2024  There is no evidence of focal consolidation or evidence of pulmonary edema.  There is no pleural effusion.    CT-Head w/o 3/10/2024  1.  No evidence of acute intracranial process.  2.  Cerebral atrophy as well as periventricular chronic small vessel ischemic change.    MRI- Head pending        General Information:  Vitals  O2 Delivery Device: None - Room Air  Level of Consciousness: Awake  Patient Behaviors: Confused, Flat Affect, Withdrawn  Orientation: Self,   Follows Directives: Inconsistent      Prior Living Situation & Level of Function:  Prior Services: Unable To Determine At This Time  Lives with - Patient's Self Care Capacity: Unable To Determine At This Time     Communication: Unable To Determine At This Time  Swallowing: Unable To Determine At This Time       Oral Mechanism Evaluation:  Dentition: Full dentures   Facial Symmetry: Pt did not follow commands to assess  Facial Sensation: Pt did not follow commands to assess     Labial Observations: Pt did not follow commands to assess   Lingual Observations: Pt did not follow commands to assess            Laryngeal Function:  Secretion Management: Adequate  Voice Quality: Whisper (?behavioral)  Cough: Perceptually WNL         Subjective  Patient cleared by RN for evaluation. Pt received asleep, easily roused to verbal cues. Pt did not answer questions or follow commands; however, once RN entered room pt able to provide name and .      Assessment  Current Method of Nutrition: NPO until cleared by speech pathology  Positioning: Hall's (60-90 degrees)  Bolus Administration:  SLP    O2 Delivery Device: None - Room Air  Factor(s) Affecting Performance: Impaired endurance, Impaired mental status, Impaired command following  Tracheostomy : No            Swallowing Trials:  Swallowing Trials  Thin Liquid (TN0): Impaired  Liquidised (LQ3): WFL      Comments: Patient needing 1:1 feeding A. Adequate oral bolus acceptance/containment with PO trials of thins and liquidized by tsp. Trace-mild anterior oral bolus loss with trials of thins by cup. Pt biting on straw, unable to adequately use. Immediate cough response with trials of thin liquids by cup in 2/4 trials, concerning for airway invasion.      Clinical Impressions  Patient presents with clinical signs and is at elevated risk for oropharyngeal dysphagia r/t decline in status/mentation, stroke. Pt's poor mentation currently contraindicates oral diet initiation. Service will follow to determine readiness for oral diet vs diagnostic swallow study.       Recommendations  Diet Consistency: NPO, pre-feeding trials with SLP only   -Clear for sips of water and minimal ice chips/hour 1:1 w/RN, when alert, after oral care to reduce xerostomia and mitigate disuse atrophy of swallow musculature  Instrumentation: Instrumental swallow study pending clinical progress  Medication: Crush with applesauce, as appropriate, Non Oral  Oral Care: Q6h         SLP Treatment Plan  Treatment Plan: Dysphagia Treatment, Patient/Family/Caregiver Training  SLP Frequency: 3x Per Week  Estimated Duration: Until Therapy Goals Met      Anticipated Discharge Needs  Discharge Recommendations: Recommend post-acute placement for additional speech therapy services prior to discharge home   Therapy Recommendations Upon DC: Dysphagia Training, Patient / Family / Caregiver Education        Patient / Family Goals  Patient / Family Goal #1: none stated  Short Term Goals  Short Term Goal # 1: Pt will participate in pre-feeding trials with SLP only with no overt s/sx of aspiration or  decline in respiratory status      Pratima Rosado, SLP

## 2024-03-11 NOTE — CARE PLAN
The patient is Stable - Low risk of patient condition declining or worsening    Shift Goals  Clinical Goals: stable neuro/MRI  Patient Goals: rest  Family Goals: communication/education    Progress made toward(s) clinical / shift goals:    Problem: Optimal Care of the Stroke Patient  Goal: Optimal acute care for the stroke patient  Outcome: Progressing     Problem: Knowledge Deficit - Stroke Education  Goal: Patient's knowledge of stroke and risk factors will improve  Outcome: Progressing     Problem: Psychosocial - Patient Condition  Goal: Patient's ability to re-evaluate and adapt role responsibilities will improve  Outcome: Progressing     Problem: Discharge Planning - Stroke  Goal: Patient’s continuum of care needs will be met  Outcome: Progressing     Problem: Neuro Status  Goal: Neuro status will remain stable or improve  Outcome: Progressing     Problem: Hemodynamic Monitoring  Goal: Patient's hemodynamics, fluid balance and neurologic status will be stable or improve  Outcome: Progressing     Problem: Respiratory - Stroke Patient  Goal: Patient will achieve/maintain optimum respiratory rate/effort  Outcome: Progressing     Problem: Dysphagia  Goal: Dysphagia will improve  Outcome: Progressing     Problem: Risk for Aspiration  Goal: Patient's risk for aspiration will be absent or decrease  Outcome: Progressing     Problem: Urinary Elimination  Goal: Establish and maintain regular urinary output  Outcome: Progressing     Problem: Bowel Elimination  Goal: Establish and maintain regular bowel function  Outcome: Progressing     Problem: Mobility - Stroke  Goal: Patient's capacity to carry out activities will improve  Outcome: Progressing     Problem: Self Care  Goal: Patient will have the ability to perform ADLs independently or with assistance (bathe, groom, dress, toilet and feed)  Outcome: Progressing     Problem: Knowledge Deficit - Standard  Goal: Patient and family/care givers will demonstrate understanding  of plan of care, disease process/condition, diagnostic tests and medications  Outcome: Progressing       Patient is not progressing towards the following goals:

## 2024-03-11 NOTE — CONSULTS
Chief Complaint   Patient presents with    Possible Stroke     Last known well at 8PM last night when her daughter checked on her. No thinners. . Pt's daughter came to see her at 9AM & found pt in bed, not responding normally verbally, weak on R side. Pt is baseline A & O x4, ambulatory, independent. Initial NIH 6.         Neurology Initial Consult H&P  Vascular Neurology Service, [facility]    History of present illness:  Keyla Sanchez is a 89 y.o. female with a PMHx of hypertension, hyperlipidemia, and prior stroke with unknown residual deficits who presented 03/10/2024 with right sided hemiplegia and altered mental status after being found by her daughter. The patient was in hypertensive emergency with blood pressures if 180/128 on presentation. Noncontrast CT head was negative for acute findings. CTA head/neck was negative for LVO, dissection, and critical flow limiting stenoses. CT Perfusion was negative for CBF defect. That patient was admitted for further work up. An MRI brain was obtained which revealed a left posterior limb internal capsule acute infarct. Neurology has been consulted for further evaluation of the above.     Referring Provider: Abner Sevilla M.D. has consulted Neurology for further evaluation    Past medical history:   No past medical history on file.    Past surgical history:   No past surgical history on file.    Family history:   No family history on file.    Social history:   Social History     Socioeconomic History    Marital status: Not on file     Spouse name: Not on file    Number of children: Not on file    Years of education: Not on file    Highest education level: Not on file   Occupational History    Not on file   Tobacco Use    Smoking status: Not on file    Smokeless tobacco: Not on file   Substance and Sexual Activity    Alcohol use: Not on file    Drug use: Not on file    Sexual activity: Not on file   Other Topics Concern    Not on file    Social History Narrative    Not on file     Social Determinants of Health     Financial Resource Strain: Not on file   Food Insecurity: Not on file   Transportation Needs: Not on file   Physical Activity: Not on file   Stress: Not on file   Social Connections: Not on file   Intimate Partner Violence: Not on file   Housing Stability: Not on file       Current medications:   Current Facility-Administered Medications   Medication Dose    aspirin (Asa) suppository 300 mg  300 mg    hydrALAZINE (Apresoline) injection 10 mg  10 mg    potassium chloride 20 mEq in LR 1,000 mL infusion      acetaminophen (Tylenol) tablet 650 mg  650 mg    atorvastatin (Lipitor) tablet 40 mg  40 mg       Medication Allergy:  No Known Allergies    Review of systems:   Unable to obtain ROS due to altered mental status    Physical examination:   Vitals:    03/11/24 0717 03/11/24 0859 03/11/24 0902 03/11/24 0907   BP: (!) 165/109 (!) 170/115 (!) 170/115 (!) 174/115   Pulse: 84 79 95 95   Resp: 16 16 16 14   Temp: 36.5 °C (97.7 °F) 36.7 °C (98.1 °F)  37.1 °C (98.8 °F)   TempSrc: Temporal Temporal     SpO2: 100% 96% 95% 95%   Weight:       Height:         General: Patient is lethargic, ill appearing  HEENT: Normocephalic, no signs of acute trauma.   Neck: supple, no meningeal signs or carotid bruits. There is normal range of motion. No tenderness on exam.   Chest: clear to auscultation. No cough.   CV: RRR, no murmurs.   Skin: no signs of acute rashes or trauma.   Musculoskeletal: joints exhibit full range of motion, without any pain to palpation. There are no signs of joint or muscle swelling. There is no tenderness to deep palpation of muscles.   Psychiatric: No hallucinatory behavior. Denies symptoms of depression or suicidal ideation. Mood and affect appear normal on exam.     NEUROLOGICAL EXAM:   Mental status, orientation: Lethargic, arouses to voice, oriented x1 (self)   Speech and language: speech is dysarthric. Unable to name  objects  Memory: unable to assess.   Cranial nerve exam: Pupils are 3-4 mm bilaterally and equally reactive to light and accommodation. Visual fields are intact by confrontation. There is no nystagmus on primary or secondary gaze. Intact full EOM in all directions of gaze. Slight right facial droop.  Motor exam: Strength is 4/5 in LUE and LLE, 0/5 in RUE, 3/5 in RLE (proximal strength moderate, distal strength weaker)  Sensory exam reveals normal sense of light touch, proprioception, vibration and pinprick in all extremities.   Deep tendon reflexes:  2+ throughout. Plantar responses are flexor. There is no clonus.   Coordination: Unable to assess  Gait: Not assessed due to patient acuity    NIHSS: National Institutes of Health Stroke Scale    [2] 1a:Level of Consciousness    0-alert 1-drowsy   2-stupor   3-coma  [1] 1b:LOC Questions                  0-both  1-one      2-neither  [2] 1c:LOC Commands                   0-both  1-one      2-neither  [0] 2: Best Gaze                     0-nl    1-partial  2-forced  [0] 3: Visual Fields                   0-nl    1-partial  2-complete 3-bilat  [1] 4: Facial Paresis                0-nl    1-minor    2-partial  3-full  MOTOR                       0-nl  [4] 5: Right Arm           1-drift  [0] 6: Left Arm             2-some effort vs gravity  [2] 7: Right Leg           3-no effort vs gravity  [0] 8: Left Leg             4-no movement                             x-untestable  [x] 9: Limb Ataxia                    0-abs   1-1_limb   2-2+_limbs       x-untestable  [0] 10:Sensory                        0-nl    1-partial  2-dense  [0] 11:Best Language/Aphasia         0-nl    1-mild/mod 2-severe   3-mute  [2] 12:Dysarthria                     0-nl    1-mild/mod 2-severe       x-untestable  [1] 13:Neglect/Inattention            0-none  1-partial  2-complete  [15] TOTAL    NIHSS Time: 03/11/2024 @ 1020    ANCILLARY DATA REVIEWED:     Lab Data Review:  Recent Results (from the past 24  hour(s))   CBC WITH DIFFERENTIAL    Collection Time: 03/10/24 11:55 AM   Result Value Ref Range    WBC 5.5 4.8 - 10.8 K/uL    RBC 4.05 (L) 4.20 - 5.40 M/uL    Hemoglobin 13.5 12.0 - 16.0 g/dL    Hematocrit 37.3 37.0 - 47.0 %    MCV 92.1 81.4 - 97.8 fL    MCH 33.3 (H) 27.0 - 33.0 pg    MCHC 36.2 (H) 32.2 - 35.5 g/dL    RDW 43.4 35.9 - 50.0 fL    Platelet Count 247 164 - 446 K/uL    MPV 8.4 (L) 9.0 - 12.9 fL    Neutrophils-Polys 71.40 44.00 - 72.00 %    Lymphocytes 18.70 (L) 22.00 - 41.00 %    Monocytes 6.80 0.00 - 13.40 %    Eosinophils 2.20 0.00 - 6.90 %    Basophils 0.70 0.00 - 1.80 %    Immature Granulocytes 0.20 0.00 - 0.90 %    Nucleated RBC 0.00 0.00 - 0.20 /100 WBC    Neutrophils (Absolute) 3.90 1.82 - 7.42 K/uL    Lymphs (Absolute) 1.02 1.00 - 4.80 K/uL    Monos (Absolute) 0.37 0.00 - 0.85 K/uL    Eos (Absolute) 0.12 0.00 - 0.51 K/uL    Baso (Absolute) 0.04 0.00 - 0.12 K/uL    Immature Granulocytes (abs) 0.01 0.00 - 0.11 K/uL    NRBC (Absolute) 0.00 K/uL   COMP METABOLIC PANEL    Collection Time: 03/10/24 11:55 AM   Result Value Ref Range    Sodium 141 135 - 145 mmol/L    Potassium 3.2 (L) 3.6 - 5.5 mmol/L    Chloride 108 96 - 112 mmol/L    Co2 21 20 - 33 mmol/L    Anion Gap 12.0 7.0 - 16.0    Glucose 107 (H) 65 - 99 mg/dL    Bun 14 8 - 22 mg/dL    Creatinine 0.80 0.50 - 1.40 mg/dL    Calcium 7.9 (L) 8.5 - 10.5 mg/dL    Correct Calcium 8.1 (L) 8.5 - 10.5 mg/dL    AST(SGOT) 15 12 - 45 U/L    ALT(SGPT) 8 2 - 50 U/L    Alkaline Phosphatase 89 30 - 99 U/L    Total Bilirubin 0.5 0.1 - 1.5 mg/dL    Albumin 3.7 3.2 - 4.9 g/dL    Total Protein 6.2 6.0 - 8.2 g/dL    Globulin 2.5 1.9 - 3.5 g/dL    A-G Ratio 1.5 g/dL   PROTHROMBIN TIME    Collection Time: 03/10/24 11:55 AM   Result Value Ref Range    PT 12.9 12.0 - 14.6 sec    INR 0.96 0.87 - 1.13   APTT    Collection Time: 03/10/24 11:55 AM   Result Value Ref Range    APTT 26.6 24.7 - 36.0 sec   COD (ADULT)    Collection Time: 03/10/24 11:55 AM   Result Value Ref Range     ABO Grouping Only AB     Rh Grouping Only NEG     Antibody Screen-Cod POS (A)    TROPONIN    Collection Time: 03/10/24 11:55 AM   Result Value Ref Range    Troponin T 13 6 - 19 ng/L   ESTIMATED GFR    Collection Time: 03/10/24 11:55 AM   Result Value Ref Range    GFR (CKD-EPI) 69 >60 mL/min/1.73 m 2   ANTIBODY IDENTIFICATION    Collection Time: 03/10/24 11:55 AM   Result Value Ref Range    Antibody Id POS, anti-D (A)    Hemoglobin A1C    Collection Time: 03/10/24 11:55 AM   Result Value Ref Range    Glycohemoglobin 5.4 4.0 - 5.6 %    Est Avg Glucose 108 mg/dL   ABO Rh Confirm    Collection Time: 03/10/24 12:13 PM   Result Value Ref Range    ABO Rh Confirm AB NEG    EKG (NOW)    Collection Time: 03/10/24 12:42 PM   Result Value Ref Range    Report       [facility]    Test Date:  2024-03-10  Pt Name:    FRANCOIS SARMIENTO                 Department: ER  MRN:        [id number]                      Room:       [facility]  Gender:     Female                       Technician: [id number]  :        [id number]                   Requested By:ER TRIAGE PROTOCOL  Order #:    [id number]                    Reading MD:    Measurements  Intervals                                Axis  Rate:       86                           P:          -1  NJ:         160                          QRS:        -33  QRSD:       90                           T:          101  QT:         405  QTc:        485    Interpretive Statements  Sinus rhythm  Anterior infarct, old  No previous ECG available for comparison     Lipid Profile    Collection Time: 24 12:54 AM   Result Value Ref Range    Cholesterol,Tot 253 (H) 100 - 199 mg/dL    Triglycerides 148 0 - 149 mg/dL    HDL 67 >=40 mg/dL     (H) <100 mg/dL       Labs reviewed by me.       Imaging reviewed by me:     MR-BRAIN-W/O   Final Result         1. Age-related cerebral atrophy.      2. Extensive periventricular and pontine white matter changes consistent with chronic  microvascular ischemic gliosis.      3. Acute bandlike area of acute infarction involving the posterior limb of the left internal capsule.      4. Multifocal areas of chronic lacunar type infarction involving the basal ganglia and right frontal periventricular white matter.      5. Marked chronic ectasia of the cavernous and supraclinoid internal carotid arteries as well as the proximal M1 segments and the distal vertebral basilar system.      DX-CHEST-PORTABLE (1 VIEW)   Final Result      Cardiomegaly.      CT-CTA NECK WITH & W/O-POST PROCESSING   Final Result      1.  Atherosclerotic plaque involving the carotid bifurcations bilaterally. This results in less than 50% diameter narrowing.      2.  Ectasia of the visualized intracranial portions of the vertebral, basilar and internal carotid arteries.      3.  No evidence of carotid or vertebral arterial occlusion or dissection.      CT-CTA HEAD WITH & W/O-POST PROCESS   Final Result      1.  No acute appearing abnormality detected. No large vessel occlusion detected.   2.  Atherosclerotic vascular disease of intracranial arteries which is detailed above. This is most notable for moderate stenoses in the right anterior cerebral artery.   3.  There is fusiform dilation of the left carotid terminus up to 6 mm in diameter although there is no saccular aneurysm detected.      CT-CEREBRAL PERFUSION ANALYSIS   Final Result      No cerebral perfusion anomaly detected.      1.  Cerebral blood flow less than 30% likely representing completed infarct = 0 mL.      2.  T Max more than 6 seconds likely representing combination of completed infarct and ischemia = 0 mL.      3.  Mismatched volume likely representing ischemic brain/penumbra = None      4.  Please note that the cerebral perfusion was performed on the limited brain tissue around the basal ganglia region. Infarct/ischemia outside the CT perfusion sections can be missed in this study.      CT-HEAD W/O   Final Result       1.  No evidence of acute intracranial process.      2.  Cerebral atrophy as well as periventricular chronic small vessel ischemic change.               Presumed mechanism by TOAST:  __Large Artery Atherosclerosis  __Small Vessel (Lacunar)  X_Cardioembolic  __Other (Sickle Cell, Vasculitis, Hypercoagulable)  __Unknown    Modified Freda Scale (MRS): 4 = Moderately severe disability; unable to walk without assistance and unable to attend to own bodily needs without assistance      ASSESSMENT AND PLAN:    Assessment and Plan:     89 y.o. female was vascular risk factors to include previous stroke who presented after being found by her daughter to have profound right sided weakness and alteration of mentation. Noncontrast CT head was negative for acute intracranial abnormalities. CTA head/neck was negative for LVO, dissection, and critical flow limiting stenoses. CT perfusion was negative for CBF defect. The primary team obtained an MRI brain which revealed an acute left posterior limb infarct of the internal capsule. The stroke team recommends starting the patient on DAPT with ASA 81mg daily and Plavix 75mg daily x 10 days.      Problem list:  Left internal capsule infarct      Recommendations:  -q4h and PRN neuro assessment. VS per nursing/unit protocol.   -BP goal is normotension, 100-130/60-80. Antihypertensives per primary team, okay to start enteral antihypertensives   -Telemetry- May defer TTE and Zio patch from neurology perspective as this is a small vessel infarct  -Stroke Labs:  and HgbA1c 5.4  -DAPT with ASA 81 mg PO q day and Plavix 75mg daily x 10 days (end of treatment 03/21/2024) Transition to monotherapy with Plavix 75 mg dails thereafter.  -Recommend increasing atorvastatin to 80 mg PO q HS. Titrate to long term LDL goal < 70  -BG management per primary team. BG goal . A1C goal <7  -PT/OT/SLP eval and treat   -DVT PPX: SCDs  -Stroke bridge Clinic follow up as outpatient  -Recommend  Palliative Care consultation to discuss goals of care     Case reviewed and plan created with Dr. Davion Solis, Vascular Neurology. Please call with any questions      GLENN Brownlee.  Vascular Neurology, [facility]

## 2024-03-11 NOTE — RESPIRATORY CARE
Respiratory Rapid Response Note    Symptoms Neuro changes     Breath Sounds  RUL Breath Sounds: Diminished (03/11/24 0859)  RML Breath Sounds: Diminished (03/11/24 0859)  RLL Breath Sounds: Diminished (03/11/24 0859)  ANTHONY Breath Sounds: Diminished (03/11/24 0859)  LLL Breath Sounds: Diminished (03/11/24 0859)              ABG Results N/A     O2 (LPM): 0 (03/11/24 0859)          Transferred to ICU

## 2024-03-11 NOTE — DISCHARGE PLANNING
[facility] Transitional Care Coordination    Referral from: [place]   Insurance Provider on Facesheet:none noted  Potential Rehab Diagnosis: R/O stroke    Chart review indicates patient may have on going medical management and may have therapy needs to possibly meet inpatient rehab facility criteria with the goal of returning to community.    D/C support: daughter     Physiatry consultation pended per protocol.  Will require therapy evaluations when medically appropriate    TCC following          Thank you for the referral.

## 2024-03-11 NOTE — PROGRESS NOTES
Hospital Medicine Daily Progress Note    Date of Service  3/11/2024    Chief Complaint  Soila Sanchez is a 89 y.o. female admitted 3/10/2024 with stroke    Hospital Course  Keyla Sanchez is a 89 y.o. female who presented 3/10/2024 with right sided paralysis.  Ms. Sanchez apparently has a past medical history of prior stroke and hypertension though details are unknown that was found by her daughter this morning with decreased level of consciousness and right-sided weakness.  Her last known well was 8 PM the night prior.  She is brought to the emergency room where CT of the head, CTA of the head and neck and CT perfusion are all negative for acute processes.  She is verbal though oriented only to name though not age nor time nor event.  She has a significant right hemiparesis.  Her blood pressure is elevated in the emergency room at 180/128.  She does not appear safe to swallow at this time and a high aspiration risk.  There are no friends nor family to corroborate her history present illness nor past medical history nor advanced directives.     I discussed the plan of care with Dr. Felder.  I have called her daughter Radha Rajan [phone] and left a message with her to call back.    Interval Problem Update  3/11 Patient is new to me today, she is in bed, RRT was called earlier today due to concern for patient worsening mental status, I have discussed with bedside nurse charge nurse RRT staff, evaluated patient at bedside, call neurology discussed case with Dr. SOLIS, we reviewed MRI of the brain along with radiology, patient with positive stroke, no bleeding, discussed with Dr. Solis regarding need for repeat CT but at this time he is recommended to continue on aspirin Plavix and statin PT OT and speech evaluation, unfortunately patient is not able to swallow safely, I have ordered per rectum aspirin, discussed with patient family regarding tube feedings, at this time patient family is going to discuss with the rest  of the family and let us know if patient will be okay to have NG tube, we discussed also regarding hospice and comfort care, all question have been answered, discussed with bedside nurse charge nurse .      Addendum: came to patient's room to discuss with patient's daughter regarding plan of care at this time patient's daughter would like to hold off on feeding tube placement, family members are coming today to visit patient and to discuss goals of care, family is considering comfort care, hospice has been consulted, I discussed with patient's daughter and  regarding comfort care, hospice, al questions answered. I spent extra 11 minutes.       I have discussed this patient's plan of care and discharge plan at IDT rounds today with Case Management, Nursing, Nursing leadership, and other members of the IDT team.    Consultants/Specialty  neuro    Code Status  DNAR/DNI    Disposition  The patient is not medically cleared for discharge to home or a post-acute facility.      I have placed the appropriate orders for post-discharge needs.    Review of Systems  Review of Systems   Unable to perform ROS: Acuity of condition        Physical Exam  Temp:  [36.3 °C (97.3 °F)-37.1 °C (98.8 °F)] 36.6 °C (97.9 °F)  Pulse:  [] 85  Resp:  [14-18] 16  BP: (154-195)/() 154/87  SpO2:  [94 %-100 %] 95 %    Physical Exam  Vitals and nursing note reviewed.   Constitutional:       General: She is in acute distress.      Appearance: She is toxic-appearing.   Eyes:      General: No scleral icterus.        Right eye: No discharge.         Left eye: No discharge.   Cardiovascular:      Rate and Rhythm: Normal rate and regular rhythm.   Pulmonary:      Effort: Pulmonary effort is normal.      Breath sounds: Normal breath sounds.   Abdominal:      General: Bowel sounds are normal.   Musculoskeletal:      Cervical back: Normal range of motion and neck supple.   Skin:     General: Skin is warm.   Neurological:       Comments: Progress right-sided weakness, follows very minimal commands, speak few words.         Fluids    Intake/Output Summary (Last 24 hours) at 3/11/2024 1630  Last data filed at 3/11/2024 0343  Gross per 24 hour   Intake --   Output 950 ml   Net -950 ml       Laboratory  Recent Labs     03/10/24  1155   WBC 5.5   RBC 4.05*   HEMOGLOBIN 13.5   HEMATOCRIT 37.3   MCV 92.1   MCH 33.3*   MCHC 36.2*   RDW 43.4   PLATELETCT 247   MPV 8.4*     Recent Labs     03/10/24  1155   SODIUM 141   POTASSIUM 3.2*   CHLORIDE 108   CO2 21   GLUCOSE 107*   BUN 14   CREATININE 0.80   CALCIUM 7.9*     Recent Labs     03/10/24  1155   APTT 26.6   INR 0.96         Recent Labs     03/11/24  0054   TRIGLYCERIDE 148   HDL 67   *       Imaging  MR-BRAIN-W/O   Final Result         1. Age-related cerebral atrophy.      2. Extensive periventricular and pontine white matter changes consistent with chronic microvascular ischemic gliosis.      3. Acute bandlike area of acute infarction involving the posterior limb of the left internal capsule.      4. Multifocal areas of chronic lacunar type infarction involving the basal ganglia and right frontal periventricular white matter.      5. Marked chronic ectasia of the cavernous and supraclinoid internal carotid arteries as well as the proximal M1 segments and the distal vertebral basilar system.      DX-CHEST-PORTABLE (1 VIEW)   Final Result      Cardiomegaly.      CT-CTA NECK WITH & W/O-POST PROCESSING   Final Result      1.  Atherosclerotic plaque involving the carotid bifurcations bilaterally. This results in less than 50% diameter narrowing.      2.  Ectasia of the visualized intracranial portions of the vertebral, basilar and internal carotid arteries.      3.  No evidence of carotid or vertebral arterial occlusion or dissection.      CT-CTA HEAD WITH & W/O-POST PROCESS   Final Result      1.  No acute appearing abnormality detected. No large vessel occlusion detected.   2.   Atherosclerotic vascular disease of intracranial arteries which is detailed above. This is most notable for moderate stenoses in the right anterior cerebral artery.   3.  There is fusiform dilation of the left carotid terminus up to 6 mm in diameter although there is no saccular aneurysm detected.      CT-CEREBRAL PERFUSION ANALYSIS   Final Result      No cerebral perfusion anomaly detected.      1.  Cerebral blood flow less than 30% likely representing completed infarct = 0 mL.      2.  T Max more than 6 seconds likely representing combination of completed infarct and ischemia = 0 mL.      3.  Mismatched volume likely representing ischemic brain/penumbra = None      4.  Please note that the cerebral perfusion was performed on the limited brain tissue around the basal ganglia region. Infarct/ischemia outside the CT perfusion sections can be missed in this study.      CT-HEAD W/O   Final Result      1.  No evidence of acute intracranial process.      2.  Cerebral atrophy as well as periventricular chronic small vessel ischemic change.              Assessment/Plan  * Ischemic stroke (HCC)- (present on admission)  Assessment & Plan  With right hemiparesis and no evidence of seizure to suggest Librado's Paralysis  CT, CTA, perfusion study negative  MRI brain ordered.  Refrain from antiplatelets given the risk of hemorrhagic conversion  Therapies ordered  Tele monitoring to eval for afib  Await a call back from her daughter to discuss history and advanced directives and tube feeding.  Neurology consult once her daughter has been contacted to discuss her wishes.  Prognosis is poor given the significant deficits and advanced age.    MRI of the brain is reviewed, discussed with radiology discussed with neurology team, had discussion with patient's family regarding imaging and prognosis, neurologist recommending aspirin and Plavix and statin, patient unfortunately is not safe to swallow, discussed with patient's family  regarding tube feeding, as per patient's daughter she is going to call rest of the family and discuss case they are considering comfort care/hospice.              Hypertensive urgency  Assessment & Plan  Discussed with neuro no indication for permissive htn  I have ordered iv hydralazine and labetalol, monitoring for side effects like hypotension bradycardia  Discussed with bedside nurse.     Hypokalemia- (present on admission)  Assessment & Plan  Potassium is low at 3.2 and will be added to IV fluids  Check BMP in the morning         VTE prophylaxis: scd    I have performed a physical exam and reviewed and updated ROS and Plan today (3/11/2024). In review of yesterday's note (3/10/2024), there are no changes except as documented above.      Total time of 66 minutes spent prepping to see patient (e.g. reviewing  tests/imaging results, notes from consultants, bedside nurse, night shift ) obtaining and/or reviewing separately obtained history. Performing a medically appropriate examination and evaluation.  Counseling and educating the patient.  Ordering medications, tests, or procedures.  Referring and communicating with other health care professionals.  Documenting clinical information in EPIC.  Independently interpreting results and communicating results to patient.  Care coordination.

## 2024-03-11 NOTE — PROGRESS NOTES
0849: pt. Assessed and found to have worsened stroke symptoms, with NIH of 24 and AxOx1; pt. Voice is soft/slurred and mostly incomprehensible. Dr. Hernandez notified and rapid reponse called; pt. Assessed by Dr. Hernandez and rapid response team. Aspirin rectal ordered and administered and MRI resulted. Last set of vitals taken at 0907: T-98.8, HR: 95, RR: 14, BP: 174/115, pulse ox: 95%. MD to follow up and communicate with pt. Family member about further treatment. Will continue to monitor.

## 2024-03-11 NOTE — PROGRESS NOTES
4 Eyes Skin Assessment Completed by COLE Rodriguez and COLE Scott.    Head WDL  Ears WDL  Nose WDL  Mouth WDL  Neck WDL  Breast/Chest WDL  Shoulder Blades WDL  Spine WDL  (R) Arm/Elbow/Hand WDL  (L) Arm/Elbow/Hand WDL  Abdomen WDL  Groin WDL   Scrotum/Coccyx/Buttocks Redness, Blanching, and Discoloration; slow to joyce  (R) Leg WDL  (L) Leg WDL  (R) Heel/Foot/Toe Scab; toe  (L) Heel/Foot/Toe WDL          Devices In Places Tele Box and Pulse Ox      Interventions In Place Sacral Mepilex, Pillows, and Q2 Turns    Possible Skin Injury No    Pictures Uploaded Into Epic Yes; sacrum  Wound Consult Placed Yes  RN Wound Prevention Protocol Ordered Yes

## 2024-03-12 ENCOUNTER — HOME CARE VISIT (OUTPATIENT)
Dept: HOSPICE | Facility: HOSPICE | Age: 89
End: 2024-03-12
Payer: MEDICARE

## 2024-03-12 PROCEDURE — 700102 HCHG RX REV CODE 250 W/ 637 OVERRIDE(OP): Performed by: HOSPITALIST

## 2024-03-12 PROCEDURE — 86480 TB TEST CELL IMMUN MEASURE: CPT

## 2024-03-12 PROCEDURE — 92526 ORAL FUNCTION THERAPY: CPT

## 2024-03-12 PROCEDURE — 99232 SBSQ HOSP IP/OBS MODERATE 35: CPT | Performed by: HOSPITALIST

## 2024-03-12 PROCEDURE — 36415 COLL VENOUS BLD VENIPUNCTURE: CPT

## 2024-03-12 PROCEDURE — A9270 NON-COVERED ITEM OR SERVICE: HCPCS | Performed by: HOSPITALIST

## 2024-03-12 PROCEDURE — 700111 HCHG RX REV CODE 636 W/ 250 OVERRIDE (IP): Performed by: HOSPITALIST

## 2024-03-12 PROCEDURE — 770001 HCHG ROOM/CARE - MED/SURG/GYN PRIV*

## 2024-03-12 PROCEDURE — 700105 HCHG RX REV CODE 258: Performed by: HOSPITALIST

## 2024-03-12 RX ADMIN — HYDRALAZINE HYDROCHLORIDE 10 MG: 20 INJECTION, SOLUTION INTRAMUSCULAR; INTRAVENOUS at 17:10

## 2024-03-12 RX ADMIN — POTASSIUM CHLORIDE: 2 INJECTION, SOLUTION, CONCENTRATE INTRAVENOUS at 17:13

## 2024-03-12 RX ADMIN — LABETALOL HYDROCHLORIDE 10 MG: 5 INJECTION INTRAVENOUS at 16:12

## 2024-03-12 RX ADMIN — ASPIRIN 300 MG: 300 SUPPOSITORY RECTAL at 06:16

## 2024-03-12 RX ADMIN — POTASSIUM CHLORIDE: 2 INJECTION, SOLUTION, CONCENTRATE INTRAVENOUS at 06:27

## 2024-03-12 RX ADMIN — LABETALOL HYDROCHLORIDE 10 MG: 5 INJECTION INTRAVENOUS at 23:23

## 2024-03-12 ASSESSMENT — PAIN DESCRIPTION - PAIN TYPE
TYPE: ACUTE PAIN

## 2024-03-12 NOTE — CARE PLAN
The patient is Watcher - Medium risk of patient condition declining or worsening    Shift Goals  Clinical Goals: neuro monitoring and comfort  Patient Goals: ALLEN  Family Goals: updates    Progress made toward(s) clinical / shift goals:      Problem: Optimal Care of the Stroke Patient  Goal: Optimal acute care for the stroke patient  Outcome: Progressing     Problem: Knowledge Deficit - Stroke Education  Goal: Patient's knowledge of stroke and risk factors will improve  Outcome: Progressing     Problem: Psychosocial - Patient Condition  Goal: Patient's ability to verbalize feelings about condition will improve  Outcome: Not Progressing  Goal: Patient's ability to re-evaluate and adapt role responsibilities will improve  Outcome: Progressing     Problem: Discharge Planning - Stroke  Goal: Patient’s continuum of care needs will be met  Outcome: Progressing     Problem: Neuro Status  Goal: Neuro status will remain stable or improve  Outcome: Progressing     Problem: Hemodynamic Monitoring  Goal: Patient's hemodynamics, fluid balance and neurologic status will be stable or improve  Outcome: Progressing     Problem: Respiratory - Stroke Patient  Goal: Patient will achieve/maintain optimum respiratory rate/effort  Outcome: Progressing     Problem: Dysphagia  Goal: Dysphagia will improve  Outcome: Progressing     Problem: Risk for Aspiration  Goal: Patient's risk for aspiration will be absent or decrease  Outcome: Progressing     Problem: Urinary Elimination  Goal: Establish and maintain regular urinary output  Outcome: Progressing     Problem: Bowel Elimination  Goal: Establish and maintain regular bowel function  Outcome: Progressing     Problem: Mobility - Stroke  Goal: Patient's capacity to carry out activities will improve  Outcome: Not Progressing     Problem: Self Care  Goal: Patient will have the ability to perform ADLs independently or with assistance (bathe, groom, dress, toilet and feed)  Outcome: Not  Progressing

## 2024-03-12 NOTE — PROGRESS NOTES
Speech note pt okay to have crushed meds with apple sauce. Notified MD Hernandez via voalt asking to switch her diet from strict NPO to NPO with meds crushed in applesauce. Will continue to monitor.

## 2024-03-12 NOTE — HOSPICE
[facility] Hospice referral/consult response    Is this patient accepted to [facility]?:Yes  What hospice level of care?:Community (if she stays in [facility])  Approved by provider: Arianne DICKERSON    Anticipated DC date: ?  DC Barriers: Quant and placement  Additional Information:    Spoke to family and had hospice discussion.      Another daughter will be in town tomorrow afternoon.     At that time they will discuss group home placement vs going home with caregivers in [place].     They agreed to follow up tomorrow afternoon.     Patient states she has no pain/anxiety. She appears to be comfortable at this time with out any medication.   In general, to qualify for the hospice level of care - general inpatient (GIP), a patient must display refractory pain or other symptoms despite an adequate trial of typical comfort measures, that cannot be effectively managed in a home hospice setting (routine home care). The determination of candidacy for GIP is always patient specific and needs to be approved by the hospice physician in collaboration with the interdisciplinary team.

## 2024-03-12 NOTE — DISCHARGE PLANNING
Case Management Discharge Planning    Admission Date: 3/10/2024  GMLOS: 2.9  ALOS: 2    6-Clicks ADL Score:    6-Clicks Mobility Score:        Anticipated Discharge Dispo: Discharge Disposition: D/T to hospice home (50)    DME Needed: No    Action(s) Taken: Updated Provider/Nurse on Discharge Plan    RN NADEGE spoke to Josesito with [facility] hospice, per Josesito Pt does not qualify for GIP but they will accept her as regular hospice Pt. Olga, admissions for [facility] will come and see Pt today and will talk to Pt's family for dc planning.     Escalations Completed: Provider    Medically Clear: Yes    Next Steps: follow up final dc plans with [facility] hosp[facility]    Barriers to Discharge: Pending Placement

## 2024-03-12 NOTE — DISCHARGE PLANNING
0928  Agency/Facility Name: [facility]  Spoke To: Josesito  Outcome: DPA inquired follow up on pending Hospice per Josesito, She's not even in comfort care, she doesn't even have comfort meds, all she has is DNR and IV fluids so she wont even qualify.   Advised RN NADEGE via teams

## 2024-03-12 NOTE — DISCHARGE PLANNING
Per chart review, patient currently does not meet criteria for IPR level of care/  Discharging to Hospice,    TCC no longer following

## 2024-03-12 NOTE — CARE PLAN
The patient is Stable - Low risk of patient condition declining or worsening    Shift Goals  Clinical Goals: comfort  Patient Goals: ALLEN  Family Goals: updates    Progress made toward(s) clinical / shift goals:    Problem: Optimal Care of the Stroke Patient  Goal: Optimal acute care for the stroke patient  Outcome: Progressing     Problem: Knowledge Deficit - Stroke Education  Goal: Patient's knowledge of stroke and risk factors will improve  Outcome: Progressing     Problem: Psychosocial - Patient Condition  Goal: Patient's ability to verbalize feelings about condition will improve  Outcome: Progressing     Problem: Neuro Status  Goal: Neuro status will remain stable or improve  Outcome: Progressing     Problem: Hemodynamic Monitoring  Goal: Patient's hemodynamics, fluid balance and neurologic status will be stable or improve  Outcome: Progressing     Problem: Dysphagia  Goal: Dysphagia will improve  Outcome: Progressing     Problem: Risk for Aspiration  Goal: Patient's risk for aspiration will be absent or decrease  Outcome: Progressing       Patient is not progressing towards the following goals:

## 2024-03-12 NOTE — PROGRESS NOTES
Monitor Summary: SR 72-88, NE 0.17, QRS 0.07, QT 0.37, with PACs and PVCs per strip from monitor room.

## 2024-03-12 NOTE — THERAPY
Occupational Therapy Contact Note    Patient Name: Soila Sanchez  Age:  89 y.o., Sex:  female  Medical Record #: [id number]  Today's Date: 3/12/2024    OT consult rec'd. Discussed with family OT role and given option to participate or opt out of OT intervention. Family requested no OT services at this time; pt now on hospice/CC. Will d/c from OT services, but please re-consult if pt has change in POC.

## 2024-03-12 NOTE — PROGRESS NOTES
Late entry:  1045:  Updated pt's status to Radha, pt's daughter.   Radha 'd like pt on comfort care/hospice and no feeding tube.  Notified dr. Hernandez during round. MD acknowledged.    1110: MD at bedside

## 2024-03-12 NOTE — PROGRESS NOTES
Monitor summary: SR 72-90, AZ 0.16, QRS 0.08, QT 0.44, with rare PVCs per strip from monitor room.

## 2024-03-12 NOTE — THERAPY
Physical Therapy Contact Note    Patient Name: Soila Sanchez  Age:  89 y.o., Sex:  female  Medical Record #: [id number]  Today's Date: 3/12/2024    Present throughout session and agree with below note.    Michelle Edmonds, PT, DPT     03/12/24 1431   Initial Contact Note    Initial Contact Note Order Received and Verified. Physical Therapy Evaluation NOT Completed Because Patient Does Not Require Acute Physical Therapy at this Time.   Interdisciplinary Plan of Care Collaboration   IDT Collaboration with  Nursing;Occupational Therapist;Family / Caregiver   Collaboration Comments PT evaluation consult received. Pt's family have opted for hospice/comfort care at this time. Upon discussion, family requested no PT services at this time. Please re-consult if there is a change in the pt's POC.   Session Information   Date / Session Number  3/12-Screen-No needs

## 2024-03-13 ENCOUNTER — HOME CARE VISIT (OUTPATIENT)
Dept: HOSPICE | Facility: HOSPICE | Age: 89
End: 2024-03-13
Payer: MEDICARE

## 2024-03-13 PROBLEM — G81.91 RIGHT HEMIPLEGIA (HCC): Status: ACTIVE | Noted: 2024-03-13

## 2024-03-13 PROBLEM — Z51.5 ENCOUNTER FOR HOSPICE CARE: Status: ACTIVE | Noted: 2024-03-13

## 2024-03-13 LAB
GAMMA INTERFERON BACKGROUND BLD IA-ACNC: 0.03 IU/ML
M TB IFN-G BLD-IMP: NEGATIVE
M TB IFN-G CD4+ BCKGRND COR BLD-ACNC: 0 IU/ML
MITOGEN IGNF BCKGRD COR BLD-ACNC: >10 IU/ML
QFT TB2 - NIL TBQ2: 0 IU/ML

## 2024-03-13 PROCEDURE — 700111 HCHG RX REV CODE 636 W/ 250 OVERRIDE (IP): Performed by: HOSPITALIST

## 2024-03-13 PROCEDURE — 99232 SBSQ HOSP IP/OBS MODERATE 35: CPT | Performed by: STUDENT IN AN ORGANIZED HEALTH CARE EDUCATION/TRAINING PROGRAM

## 2024-03-13 PROCEDURE — 700105 HCHG RX REV CODE 258: Performed by: HOSPITALIST

## 2024-03-13 PROCEDURE — A9270 NON-COVERED ITEM OR SERVICE: HCPCS | Performed by: HOSPITALIST

## 2024-03-13 PROCEDURE — 770001 HCHG ROOM/CARE - MED/SURG/GYN PRIV*

## 2024-03-13 PROCEDURE — 700102 HCHG RX REV CODE 250 W/ 637 OVERRIDE(OP): Performed by: HOSPITALIST

## 2024-03-13 PROCEDURE — 92526 ORAL FUNCTION THERAPY: CPT

## 2024-03-13 RX ORDER — AMLODIPINE BESYLATE 5 MG/1
5 TABLET ORAL
Status: DISCONTINUED | OUTPATIENT
Start: 2024-03-13 | End: 2024-03-14

## 2024-03-13 RX ADMIN — LABETALOL HYDROCHLORIDE 10 MG: 5 INJECTION INTRAVENOUS at 16:55

## 2024-03-13 RX ADMIN — HYDRALAZINE HYDROCHLORIDE 10 MG: 20 INJECTION, SOLUTION INTRAMUSCULAR; INTRAVENOUS at 18:04

## 2024-03-13 RX ADMIN — LABETALOL HYDROCHLORIDE 10 MG: 5 INJECTION INTRAVENOUS at 07:30

## 2024-03-13 RX ADMIN — POTASSIUM CHLORIDE: 2 INJECTION, SOLUTION, CONCENTRATE INTRAVENOUS at 06:26

## 2024-03-13 RX ADMIN — ASPIRIN 300 MG: 300 SUPPOSITORY RECTAL at 06:20

## 2024-03-13 RX ADMIN — POTASSIUM CHLORIDE: 2 INJECTION, SOLUTION, CONCENTRATE INTRAVENOUS at 17:57

## 2024-03-13 ASSESSMENT — ENCOUNTER SYMPTOMS
BACK PAIN: 0
HEADACHES: 0
MYALGIAS: 0

## 2024-03-13 ASSESSMENT — COGNITIVE AND FUNCTIONAL STATUS - GENERAL
WALKING IN HOSPITAL ROOM: TOTAL
SUGGESTED CMS G CODE MODIFIER DAILY ACTIVITY: CN
PERSONAL GROOMING: TOTAL
CLIMB 3 TO 5 STEPS WITH RAILING: TOTAL
MOBILITY SCORE: 6
MOVING FROM LYING ON BACK TO SITTING ON SIDE OF FLAT BED: TOTAL
EATING MEALS: TOTAL
DAILY ACTIVITIY SCORE: 6
HELP NEEDED FOR BATHING: TOTAL
MOVING TO AND FROM BED TO CHAIR: TOTAL
TURNING FROM BACK TO SIDE WHILE IN FLAT BAD: TOTAL
TOILETING: TOTAL
DRESSING REGULAR UPPER BODY CLOTHING: TOTAL
DRESSING REGULAR LOWER BODY CLOTHING: TOTAL
STANDING UP FROM CHAIR USING ARMS: TOTAL
SUGGESTED CMS G CODE MODIFIER MOBILITY: CN

## 2024-03-13 ASSESSMENT — PAIN DESCRIPTION - PAIN TYPE
TYPE: ACUTE PAIN
TYPE: ACUTE PAIN

## 2024-03-13 NOTE — CARE PLAN
The patient is Stable - Low risk of patient condition declining or worsening    Shift Goals  Clinical Goals: safety, SBP<150, stable/improved neuro status  Patient Goals: alexandre  Family Goals: alexandre    Progress made toward(s) clinical / shift goals:    Problem: Optimal Care of the Stroke Patient  Goal: Optimal acute care for the stroke patient  Outcome: Progressing     Problem: Knowledge Deficit - Stroke Education  Goal: Patient's knowledge of stroke and risk factors will improve  Outcome: Progressing     Problem: Psychosocial - Patient Condition  Goal: Patient's ability to re-evaluate and adapt role responsibilities will improve  Outcome: Progressing     Problem: Neuro Status  Goal: Neuro status will remain stable or improve  Outcome: Progressing     Problem: Hemodynamic Monitoring  Goal: Patient's hemodynamics, fluid balance and neurologic status will be stable or improve  Outcome: Progressing     Problem: Dysphagia  Goal: Dysphagia will improve  Outcome: Progressing     Problem: Risk for Aspiration  Goal: Patient's risk for aspiration will be absent or decrease  Outcome: Progressing       Patient is not progressing towards the following goals:

## 2024-03-13 NOTE — HOSPICE
Spoke to family at bedside. They stated they have decided to go home with [facility] as they live [facility]. They have reached out to patients PCP Dr. Hendrix in [facility] and a referral was sent from PCP office for hospice also.    NADEGE Hightower and Kimberly abarca.   They will follow up with family.

## 2024-03-13 NOTE — DISCHARGE PLANNING
Case Management Discharge Planning    Admission Date: 3/10/2024  GMLOS: 2.9  ALOS: 3    6-Clicks ADL Score:    6-Clicks Mobility Score:        Anticipated Discharge Dispo: Discharge Disposition: D/T to hospice home (50)    DME Needed: No    Action(s) Taken: Family Conference    This RN CM followed up with [facility] hospice and discussed plan. Per [facility] after meeting yesterday, family was still unsure about home with hospice to [place] or group home with hospice. Per Liaison, another daughter will arrive today at 2:30 and they will have another discussion on discharge on hospice plan.     This RN CM updated bedside RN, charge RN, and Physician on plan is still incomplete. Provided options for group home at bedside for family to review.       Escalations Completed: Pending Discharge Destination    Medically Clear: Yes    Next Steps: Follow up with Hospice RN after meeting today at 2:30 and assist with group home or home with hospice.     3102 This RN case manager followed up with [facility] and family has decided to go home with [facility]. Sending referral to [facility]. Will update Dr Arambula and bedside RN.     Barriers to Discharge: Pending Placement    Is the patient up for discharge tomorrow: No

## 2024-03-13 NOTE — DISCHARGE PLANNING
Hospice referral sent to Pt fist choice , [facility] .   Choice Form placed in  .

## 2024-03-13 NOTE — PROGRESS NOTES
Hospital Medicine Daily Progress Note    Date of Service  3/12/2024    Chief Complaint  Soila Sanchez is a 89 y.o. female admitted 3/10/2024 with stroke    Hospital Course  Keyla Sanchez is a 89 y.o. female who presented 3/10/2024 with right sided paralysis.  Ms. Sanchez apparently has a past medical history of prior stroke and hypertension though details are unknown that was found by her daughter this morning with decreased level of consciousness and right-sided weakness.  Her last known well was 8 PM the night prior.  She is brought to the emergency room where CT of the head, CTA of the head and neck and CT perfusion are all negative for acute processes.  She is verbal though oriented only to name though not age nor time nor event.  She has a significant right hemiparesis.  Her blood pressure is elevated in the emergency room at 180/128.  She does not appear safe to swallow at this time and a high aspiration risk.  There are no friends nor family to corroborate her history present illness nor past medical history nor advanced directives.     I discussed the plan of care with Dr. Felder.  I have called her daughter Radha Rajan [phone] and left a message with her to call back.    Interval Problem Update  3/11 Patient is new to me today, she is in bed, RRT was called earlier today due to concern for patient worsening mental status, I have discussed with bedside nurse charge nurse RRT staff, evaluated patient at bedside, call neurology discussed case with Dr. SOLIS, we reviewed MRI of the brain along with radiology, patient with positive stroke, no bleeding, discussed with Dr. Solis regarding need for repeat CT but at this time he is recommended to continue on aspirin Plavix and statin PT OT and speech evaluation, unfortunately patient is not able to swallow safely, I have ordered per rectum aspirin, discussed with patient family regarding tube feedings, at this time patient family is going to discuss with the rest  of the family and let us know if patient will be okay to have NG tube, we discussed also regarding hospice and comfort care, all question have been answered, discussed with bedside nurse charge nurse .  3/12 patient is resting in bed, she was able to tell me her name, she does not know where she is at patient speaking in very soft voice and she gets tired quickly and falls asleep, patient evaluated by PT OT patient is still n.p.o. but recommended to start ice chips with one-to-one supervision, discussed with patient's daughter regarding plan of care, at this time they are still thinking that tube feeding is not what patient will like and they are leaning towards comfort care, hospice has been consulted, patient is still requiring IV labetalol and hydralazine to keep her blood pressure under control, will continue close monitoring, discussed with patient's daughter and family regarding continuing with blood work and at this time they would like to continue just current plan of care with gentle hydration and IV medications no escalating care no more blood work or testing, will follow-up with hospice, I have discussed with , charge nurse, bedside nurse all question have been answered.        Addendum: came to patient's room to discuss with patient's daughter regarding plan of care at this time patient's daughter would like to hold off on feeding tube placement, family members are coming today to visit patient and to discuss goals of care, family is considering comfort care, hospice has been consulted, I discussed with patient's daughter and  regarding comfort care, hospice, al questions answered. I spent extra 11 minutes.       I have discussed this patient's plan of care and discharge plan at IDT rounds today with Case Management, Nursing, Nursing leadership, and other members of the IDT team.    Consultants/Specialty  neuro    Code Status  DNAR/DNI    Disposition  The patient is not  medically cleared for discharge to home or a post-acute facility.      I have placed the appropriate orders for post-discharge needs.    Review of Systems  Review of Systems   Unable to perform ROS: Acuity of condition        Physical Exam  Temp:  [36.4 °C (97.5 °F)-37.2 °C (99 °F)] 36.6 °C (97.9 °F)  Pulse:  [70-98] 74  Resp:  [16-18] 16  BP: (136-202)/() 154/92  SpO2:  [93 %-96 %] 93 %    Physical Exam  Vitals and nursing note reviewed.   Constitutional:       General: She is in acute distress.      Appearance: She is toxic-appearing.   Eyes:      General: No scleral icterus.        Right eye: No discharge.         Left eye: No discharge.   Cardiovascular:      Rate and Rhythm: Normal rate and regular rhythm.   Pulmonary:      Effort: Pulmonary effort is normal.      Breath sounds: Normal breath sounds.   Abdominal:      General: Bowel sounds are normal.   Musculoskeletal:      Cervical back: Normal range of motion and neck supple.   Skin:     General: Skin is warm.   Neurological:      Comments: Progress right-sided weakness, follows very minimal commands, speak few words.         Fluids    Intake/Output Summary (Last 24 hours) at 3/12/2024 1843  Last data filed at 3/12/2024 0617  Gross per 24 hour   Intake --   Output 650 ml   Net -650 ml       Laboratory  Recent Labs     03/10/24  1155   WBC 5.5   RBC 4.05*   HEMOGLOBIN 13.5   HEMATOCRIT 37.3   MCV 92.1   MCH 33.3*   MCHC 36.2*   RDW 43.4   PLATELETCT 247   MPV 8.4*     Recent Labs     03/10/24  1155   SODIUM 141   POTASSIUM 3.2*   CHLORIDE 108   CO2 21   GLUCOSE 107*   BUN 14   CREATININE 0.80   CALCIUM 7.9*     Recent Labs     03/10/24  1155   APTT 26.6   INR 0.96         Recent Labs     03/11/24  0054   TRIGLYCERIDE 148   HDL 67   *       Imaging  MR-BRAIN-W/O   Final Result         1. Age-related cerebral atrophy.      2. Extensive periventricular and pontine white matter changes consistent with chronic microvascular ischemic gliosis.      3.  Acute bandlike area of acute infarction involving the posterior limb of the left internal capsule.      4. Multifocal areas of chronic lacunar type infarction involving the basal ganglia and right frontal periventricular white matter.      5. Marked chronic ectasia of the cavernous and supraclinoid internal carotid arteries as well as the proximal M1 segments and the distal vertebral basilar system.      DX-CHEST-PORTABLE (1 VIEW)   Final Result      Cardiomegaly.      CT-CTA NECK WITH & W/O-POST PROCESSING   Final Result      1.  Atherosclerotic plaque involving the carotid bifurcations bilaterally. This results in less than 50% diameter narrowing.      2.  Ectasia of the visualized intracranial portions of the vertebral, basilar and internal carotid arteries.      3.  No evidence of carotid or vertebral arterial occlusion or dissection.      CT-CTA HEAD WITH & W/O-POST PROCESS   Final Result      1.  No acute appearing abnormality detected. No large vessel occlusion detected.   2.  Atherosclerotic vascular disease of intracranial arteries which is detailed above. This is most notable for moderate stenoses in the right anterior cerebral artery.   3.  There is fusiform dilation of the left carotid terminus up to 6 mm in diameter although there is no saccular aneurysm detected.      CT-CEREBRAL PERFUSION ANALYSIS   Final Result      No cerebral perfusion anomaly detected.      1.  Cerebral blood flow less than 30% likely representing completed infarct = 0 mL.      2.  T Max more than 6 seconds likely representing combination of completed infarct and ischemia = 0 mL.      3.  Mismatched volume likely representing ischemic brain/penumbra = None      4.  Please note that the cerebral perfusion was performed on the limited brain tissue around the basal ganglia region. Infarct/ischemia outside the CT perfusion sections can be missed in this study.      CT-HEAD W/O   Final Result      1.  No evidence of acute intracranial  process.      2.  Cerebral atrophy as well as periventricular chronic small vessel ischemic change.              Assessment/Plan  * Ischemic stroke (HCC)- (present on admission)  Assessment & Plan  With right hemiparesis and no evidence of seizure to suggest Librado's Paralysis  CT, CTA, perfusion study negative  MRI brain ordered.  Refrain from antiplatelets given the risk of hemorrhagic conversion  Therapies ordered  Tele monitoring to eval for afib  Await a call back from her daughter to discuss history and advanced directives and tube feeding.  Neurology consult once her daughter has been contacted to discuss her wishes.  Prognosis is poor given the significant deficits and advanced age.    MRI of the brain is reviewed, discussed with radiology discussed with neurology team, had discussion with patient's family regarding imaging and prognosis, neurologist recommending aspirin and Plavix and statin, patient unfortunately is not safe to swallow, discussed with patient's family regarding tube feeding, as per patient's daughter she is going to call rest of the family and discuss case they are considering comfort care/hospice.              Hypertensive urgency  Assessment & Plan  Discussed with neuro no indication for permissive htn  I have ordered iv hydralazine and labetalol, monitoring for side effects like hypotension bradycardia  Discussed with bedside nurse.     Patient is still requiring iv labetalol and hydralazine to control her hypertension, bp 202/118  Monitoring for side effects including but not limited to hypotension / bradycardia.     Hypokalemia- (present on admission)  Assessment & Plan  Potassium is low at 3.2 and will be added to IV fluids  Check BMP in the morning         VTE prophylaxis: scd    I have performed a physical exam and reviewed and updated ROS and Plan today (3/12/2024). In review of yesterday's note (3/11/2024), there are no changes except as documented above.      I have reviewed blood  glucose level, I have discussed case with patient's daughter,  Patient is on IV labetalol and hydralazine to control her blood pressure, monitoring for side effects include but not limited to hypotension bradycardia.

## 2024-03-13 NOTE — THERAPY
"Speech Language Pathology   Daily Treatment     Patient Name: Soila Sanchez  AGE:  89 y.o., SEX:  female  Medical Record #: [id number]  Date of Service: 3/13/2024      Precautions:  Precautions: Fall Risk, Swallow Precautions         Subjective  Patient cleared by RN for session. Pt received awake, A&Ox1 (self only). Pt unable to follow commands, often stating \"I'm sorry.\"       Assessment  Patient seen this date for dysphagia management with focus on assessing readiness for PO diet vs instrumental swallow study. PO trials of ice chips, thin liquids via cup/straw and liquidized assessed. Adequate oral bolus acceptance and bolus stripping from utensil. Oral bolus hold (~5-10sec) across all trials. Immediate cough response across all trials, prolonged cough with trials of thins. All concerning for airway invasion.       Clinical Impressions  Patient presents with clinical signs of and is at elevated risk for oropharyngeal dysphagia 2/2 ischemic stroke and ALOC. Recommend diagnostic swallow study to objectively assess swallow function and inform POC; however, pt is not appropriate to participate this date given ALOC. Given this is day 3 of no nutrition, recommend alternate source of nutrition/hydration/meds if in alignment with GOC.      Recommendations  Treatment Completed: Dysphagia Treatment       Dysphagia Treatment  Diet Consistency: NPO, pre-feeding trials with SLP only   -Clear for minimal ice chips/hour 1:1 w/RN, when alert, after oral care to reduce xerostomia and mitigate disuse atrophy of swallow musculature  Instrumentation: Instrumental swallow study pending clinical progress (If in alignment with POC)  Medication: Non Oral, Crush with applesauce, as appropriate  Oral Care: Q4h                     SLP Treatment Plan  Treatment Plan: Dysphagia Treatment, Patient/Family/Caregiver Training  SLP Frequency: 3x Per Week  Estimated Duration: Until Therapy Goals Met      Anticipated Discharge Needs  Discharge " Recommendations: Recommend post-acute placement for additional speech therapy services prior to discharge home  Therapy Recommendations Upon DC: Dysphagia Training, Patient / Family / Caregiver Education      Patient / Family Goals  Patient / Family Goal #1: none stated  Goal #1 Outcome: Progressing as expected  Short Term Goals  Short Term Goal # 1: Pt will participate in pre-feeding trials with SLP only with no overt s/sx of aspiration or decline in respiratory status  Goal Outcome # 1: Progressing slower than expected      Pratima Rosado, SLP

## 2024-03-14 PROBLEM — I16.0 HYPERTENSIVE URGENCY: Status: RESOLVED | Noted: 2024-03-11 | Resolved: 2024-03-14

## 2024-03-14 PROBLEM — R13.12 OROPHARYNGEAL DYSPHAGIA: Status: ACTIVE | Noted: 2024-03-14

## 2024-03-14 PROBLEM — E87.6 HYPOKALEMIA: Status: RESOLVED | Noted: 2024-03-10 | Resolved: 2024-03-14

## 2024-03-14 PROCEDURE — 700111 HCHG RX REV CODE 636 W/ 250 OVERRIDE (IP): Performed by: HOSPITALIST

## 2024-03-14 PROCEDURE — 770001 HCHG ROOM/CARE - MED/SURG/GYN PRIV*

## 2024-03-14 PROCEDURE — A9270 NON-COVERED ITEM OR SERVICE: HCPCS | Performed by: HOSPITALIST

## 2024-03-14 PROCEDURE — 92612 ENDOSCOPY SWALLOW (FEES) VID: CPT

## 2024-03-14 PROCEDURE — 700105 HCHG RX REV CODE 258: Performed by: HOSPITALIST

## 2024-03-14 PROCEDURE — A9270 NON-COVERED ITEM OR SERVICE: HCPCS | Performed by: STUDENT IN AN ORGANIZED HEALTH CARE EDUCATION/TRAINING PROGRAM

## 2024-03-14 PROCEDURE — 99232 SBSQ HOSP IP/OBS MODERATE 35: CPT | Performed by: STUDENT IN AN ORGANIZED HEALTH CARE EDUCATION/TRAINING PROGRAM

## 2024-03-14 PROCEDURE — 92526 ORAL FUNCTION THERAPY: CPT

## 2024-03-14 PROCEDURE — 700102 HCHG RX REV CODE 250 W/ 637 OVERRIDE(OP): Performed by: HOSPITALIST

## 2024-03-14 PROCEDURE — 97550 CAREGIVER TRAING 1ST 30 MIN: CPT

## 2024-03-14 PROCEDURE — 700102 HCHG RX REV CODE 250 W/ 637 OVERRIDE(OP): Performed by: STUDENT IN AN ORGANIZED HEALTH CARE EDUCATION/TRAINING PROGRAM

## 2024-03-14 RX ORDER — AMLODIPINE BESYLATE 5 MG/1
5 TABLET ORAL
Status: DISCONTINUED | OUTPATIENT
Start: 2024-03-14 | End: 2024-03-15 | Stop reason: HOSPADM

## 2024-03-14 RX ADMIN — POTASSIUM CHLORIDE: 2 INJECTION, SOLUTION, CONCENTRATE INTRAVENOUS at 05:30

## 2024-03-14 RX ADMIN — HYDRALAZINE HYDROCHLORIDE 10 MG: 20 INJECTION, SOLUTION INTRAMUSCULAR; INTRAVENOUS at 16:18

## 2024-03-14 RX ADMIN — LABETALOL HYDROCHLORIDE 10 MG: 5 INJECTION INTRAVENOUS at 19:50

## 2024-03-14 RX ADMIN — ASPIRIN 300 MG: 300 SUPPOSITORY RECTAL at 04:26

## 2024-03-14 RX ADMIN — AMLODIPINE BESYLATE 5 MG: 5 TABLET ORAL at 19:56

## 2024-03-14 RX ADMIN — POTASSIUM CHLORIDE: 2 INJECTION, SOLUTION, CONCENTRATE INTRAVENOUS at 17:01

## 2024-03-14 NOTE — DISCHARGE PLANNING
DC Transport Scheduled    Transport Company Scheduled:  [employer]  Spoke with Meera at [facility] to schedule transport.    Scheduled Date: 3/15/2024  Scheduled Time: 1300    Destination: Home   Destination address: [address] [place] [address]    Notified care team of scheduled transport via [employer].     If there are any changes needed to the DC transportation scheduled, please contact [facility] at ext. [phone] between the hours of 9473-5414 Mon-Fri. If outside those hours, contact the ED Case Manager at ext. [phone].

## 2024-03-14 NOTE — CARE PLAN
The patient is Watcher - Medium risk of patient condition declining or worsening    Shift Goals  Clinical Goals: SBP < 150/Monitor Neuro Status  Patient Goals: ALLEN  Family Goals: ALLEN    Progress made toward(s) clinical / shift goals: Assumed care of patient at 1915. Patient is A&Ox1-2, to self and place. Q4hr neuro checks are being completed. SBP is being maintained < 150 mmHg. Fall/Aspiration precautions are in place. Bed is low and locked, bed alarm is on, call light is within reach, hourly rounding continues.     Problem: Optimal Care of the Stroke Patient  Goal: Optimal acute care for the stroke patient  Outcome: Progressing     Problem: Neuro Status  Goal: Neuro status will remain stable or improve  Outcome: Progressing  Note: Q4hr neuro checks - stable      Problem: Skin Integrity  Goal: Skin integrity is maintained or improved  Outcome: Progressing  Note: Q2hr turns are in place.     Patient is not progressing towards the following goals:    Problem: Knowledge Deficit - Stroke Education  Goal: Patient's knowledge of stroke and risk factors will improve  Outcome: Not Progressing  Note: Patient is unable to demonstrate understanding.      Problem: Urinary and Bowel Elimination  Goal: Establish and maintain regular urinary output  Outcome: Not Progressing  Note: Patient is incontinent of bowel and bladder. Purewick in place.

## 2024-03-14 NOTE — DISCHARGE SUMMARY
Discharge Summary    CHIEF COMPLAINT ON ADMISSION  Chief Complaint   Patient presents with    Possible Stroke     Last known well at 8PM last night when her daughter checked on her. No thinners. . Pt's daughter came to see her at 9AM & found pt in bed, not responding normally verbally, weak on R side. Pt is baseline A & O x4, ambulatory, independent. Initial NIH 6.       Reason for Admission  Acute right hemiplegia concerning for acute stroke    Admission Date  3/10/2024    CODE STATUS  DNAR/DNI    HPI & HOSPITAL COURSE  Keyla Sanchez is a 89 y.o. female who presented 3/10/2024 with right sided paralysis.  This is a pleasant woman with a history of prior stroke and hypertension.  Patient was found by the daughter in the morning of her presentation with decreased level of consciousness and right-sided weakness.  She was last known well at 8 PM tonight prior.  She was brought to the emergency room, where head CT and CTA of the head and neck as well as CT perfusion studies were negative for acute process.  The patient remained verbal and oriented to name only.  Not to age or event.  She had significant right hemiparesis on presentation.  Blood pressure was elevated at 180/128.  She appeared to have difficulty with swallowing and at high risk of aspiration.  Dr. Ton Solis of neurology was consulted.  Patient was deemed out of window for TNKase administration.  Recommended and clopidogrel as well as along with physical therapy, Occupational Therapy, and speech therapy evaluations.    Patient was admitted to the neurology floor for further care and workup.  MRI of the brain was performed, which revealed an acute stroke along the left posterior limb of the internal capsule.  Upon further discussion with the family in regards to goals of care, the family requested the patient to be transitions to hospice on discharge to home.  Case management discussed discharge options, and they have requested the patient to be  discharged to home with home hospice at [place].    Speech therapy was consulted and the patient underwent a FEES study, and the patient's diet was advanced.  Patient was evaluated by physical medicine and rehabilitation and was deemed not to be a candidate for inpatient rehabilitation as the family wanted to pursue hospice.    Therefore, she is discharged in guarded and stable condition to hospice.    The patient met 2-midnight criteria for an inpatient stay at the time of discharge.    Discharge Date  3/15/2024    FOLLOW UP ITEMS POST DISCHARGE  -As per [facility].    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  Omar Klein D.O.  [id number] [address]  [phone]            MEDICATIONS ON DISCHARGE     Medication List        STOP taking these medications      amLODIPine 10 MG Tabs  Commonly known as: Norvasc     CLOPIDOGREL BISULFATE PO              Allergies  No Known Allergies    DIET  Orders Placed This Encounter   Procedures    Diet Order Diet: Level 4 - Pureed; Liquid level: Level 2 - Mildly Thick; Tray Modifications (optional): SLP - Deliver to Nursing Station, SLP - 1:1 Supervision by Nursing     Standing Status:   Standing     Number of Occurrences:   1     Order Specific Question:   Diet:     Answer:   Level 4 - Pureed [25]     Order Specific Question:   Liquid level     Answer:   Level 2 - Mildly Thick     Order Specific Question:   Tray Modifications (optional)     Answer:   SLP - Deliver to Nursing Station     Order Specific Question:   Tray Modifications (optional)     Answer:   SLP - 1:1 Supervision by Nursing        ACTIVITY  As tolerated.  Weight bearing as tolerated    CONSULTATIONS  Neurology  Physical Medicine and Rehabilitation    PROCEDURES  None    LABORATORY  Lab Results   Component Value Date    SODIUM 141 03/10/2024    POTASSIUM 3.2 (L) 03/10/2024    CHLORIDE 108 03/10/2024    CO2 21 03/10/2024    GLUCOSE 107 (H) 03/10/2024    BUN 14 03/10/2024    CREATININE 0.80 03/10/2024        Lab Results   Component Value Date    WBC 5.5 03/10/2024    HEMOGLOBIN 13.5 03/10/2024    HEMATOCRIT 37.3 03/10/2024    PLATELETCT 247 03/10/2024        Total time of the discharge process 33 minutes.

## 2024-03-14 NOTE — CARE PLAN
The patient is Stable - Low risk of patient condition declining or worsening    Shift Goals  Clinical Goals: Discharge plan set up  Patient Goals: ALLEN  Family Goals: ALLEN    Progress made toward(s) clinical / shift goals:  Plan to discharge home on Hospice tomorrow at 1300. Family at bedside. No complaints at this time. Patient now on a L4L0 diet.     Patient is not progressing towards the following goals:      Problem: Knowledge Deficit - Stroke Education  Goal: Patient's knowledge of stroke and risk factors will improve  Outcome: Not Progressing  Note: Patient going home on Hospice     Problem: Psychosocial - Patient Condition  Goal: Patient's ability to verbalize feelings about condition will improve  Outcome: Not Progressing  Note: Patient going home on Hospice  Goal: Patient's ability to re-evaluate and adapt role responsibilities will improve  Outcome: Not Progressing  Note: Patient going home on Hospice     Problem: Mobility - Stroke  Goal: Patient's capacity to carry out activities will improve  Outcome: Not Progressing  Note: Patient going home on Hospice     Problem: Self Care  Goal: Patient will have the ability to perform ADLs independently or with assistance (bathe, groom, dress, toilet and feed)  Outcome: Not Progressing  Note: Patient going home on Hospice     Problem: Knowledge Deficit - Standard  Goal: Patient and family/care givers will demonstrate understanding of plan of care, disease process/condition, diagnostic tests and medications  Outcome: Not Progressing  Note: Patient going home on Hospice

## 2024-03-14 NOTE — ASSESSMENT & PLAN NOTE
03/13/24  Patient's family requesting transition to hospice upon discharge.  Case management assisting for placement.

## 2024-03-14 NOTE — THERAPY
Speech Language Pathology   Flexible Endoscopic Evaluation of Swallowing (FEES)        Patient Name: Soila Sanchez  AGE:  89 y.o., SEX:  female  Medical Record #: [id number]  Date of Service: 3/14/2024      History of Present Illness  89 y.o. female who presented 3/10/2024 with right sided paralysis.     PMHx stroke, HTN  No previous h/o SLP services at [facility]     CMHx Ischemic stroke, hypokalemia      Pertinent Information  Current Method of Nutrition: NPO until cleared by speech pathology  Patient Behaviors: Fatigue, Lethargic   Dentition: Full dentures   Feeding Tube: None   Tracheostomy: No   Factor(s) Affecting Performance: Impaired mental status, Impaired command following     Discussed the risks, benefits, and alternatives of the FEES procedure. Patient/family acknowledged and agreed to proceed.      Assessment  Flexible Endoscopic Evaluation of Swallowing (FEES) completed at bedside today. The endoscope was passed transnasally via Right nare to evaluate the anatomy and physiology of swallowing. Pt tolerated the procedure with no apparent distress.    Anatomic Findings: small space occupying lesion past first tracheal ring in L anterior trachea    Vocal Fold Motion: Bilateral movement  Secretion Management: Adequate  PO Trials: Thin Liquid, Mildly Thick Liquid, Liquidised, Pudding      Consistency PAS Score Timing Residue Comments   Thin Liquid (TN0) 7 Pre Swallow, During swallow Vallecular Residue: Trace (1%-5%)  Pyriform Sinus Residue: None (0%) Tsp: PAS 7  Cup: PAS 7, 1, 5   Mildly Thick (MT2) 1 N/A Vallecular Residue: Trace (1%-5%)  Pyriform Sinus Residue: None (0%) Cup: PAS 1, 1   Liquidised (LQ3) 1 N/A Vallecular Residue: Trace (1%-5%)  Pyriform Sinus Residue: None (0%) PAS 1, 1   Pudding (PU4) 1 N/A Vallecular Residue: Trace (1%-5%)  Pyriform Sinus Residue: Trace (1%-5%) PAS 1, 1     Penetration-Aspiration Scale (PAS)  1     No contrast enters airway  2     Contrast enters the airway, remains above the  vocal folds, and is ejected from the airway (not seen in the airway at the end of the swallow).  3     Contrast enters the airway, remains above the vocal folds, and is not ejected from the airway (is seen in the airway after the swallow).  4     Contrast enters the airway, contacts the vocal folds, and is ejected from the airway.  5     Contrast enters the airway, contacts the vocal folds, and is not ejected from the airway  6     Contrast enters the airway, crosses the plane of the vocal folds, and is ejected from the airway.  7     Contrast enters the airway, crosses the plane of the vocal folds, and is not ejected from the airway despite effort.  8     Contrast enters the airway, crosses the plane of the vocal folds, is not ejected from the airway and there is no response to aspiration.      Oral phase:  Poor labial seal around cup with R anterior bolus loss of liquids. Unable to coordinate bolus extraction from straw despite max cues. Prolonged AP transfer with patient requiring cues to swallow. Chewable solids held r/t bedside performance and oral phase deficits.     Pharyngeal phase:  - Delayed swallow initiation resulted in aspiration of TN0 by tsp/cup before the swallow.  - Incomplete laryngeal vestibule closure resulted in laryngeal penetration to the level of the vocal folds with TN0 by cup during the swallow.    - Trace pharyngeal residue. which is a normal variant of the swallow.     Compensatory Strategies:  - Reflexive cough ineffective to clear penetrate/aspirate.   - Cues to swallow reduced length of oral bolus holding.   Did not follow commands for other strategies.        Severity Rating:  LISA: Mild      Caregiver Training Session:  Discussed with patient's daughters, son, and grandson in separate family conference without patient present per family request. Provided education regarding swallow physiology, penetration/aspiration, and results of FEES. Discussed negative effects of use of thickened  liquids including dehydration, increased risk for UTI (associated detriments on older populations), and impact on quality of life. Patient's family expressed concern about patient choking - SLP in agreement with solid recommendation of PU4 to reduce choking risk. Discussed potential benefit for eating despite risk (improved quality of life, increased hydration) with PNA mitigation strategies (oral care pre/post meals, mobility as able) given patient is planned to discharge on hospice. Patient's family interested in modified diet of PU4/MT2 to reduce aspiration risk and increase patient comfort (cough resolution). Family also interested in supplying thin water between meals for comfort/hydration. Discussed cough as reliable indicator of aspiration on FEES this date to guide feeding upon discharge. Provided education regarding safe feeding cues: do not feed when patient is turning away, clamping mouth shut, not awake, or with very prolonged oral bolus holding. Family expressed understanding. Provided education regarding how to thicken and what beverages to thicken verbally and by action.       Clinical Impressions  Patient presents with a mild oropharyngeal dysphagia, likely acute on chronic r/t acute stroke, current mentation, and generalized weakness atop stroke history and advanced age. Swallow safety is impaired, while swallow efficiency is intact. Primary deficits include incomplete laryngeal vestibule closure, loss of oral bolus control, and delayed swallow initiation. Deficits are best managed with diet modification. Patient is a guarded candidate for behavioral swallow rehabilitation. Recommend diet initiation of PU4/MT2 with thin water between meals after oral care in alignment with goals of care.       Recommendations  Diet Consistency: Pureed solids, mildly thick liquids (thin water between meals after oral care)  Medication: Crush with applesauce, as appropriate  Supervision: 1:1 feeding with constant  "supervision  Positioning: Fully upright and midline during oral intake  Strategies: Small bites/sips, Slow rate of intake (cue to swallow when oral bolus holding)  Oral Care: Pre- and post-meals  Additional Instrumentation: None         SLP Treatment Plan  Treatment Plan: Dysphagia Treatment  SLP Frequency: 3x Per Week  Estimated Duration: Until Therapy Goals Met      Anticipated Discharge Needs  Discharge Recommendations: Recommend post-acute placement for additional speech therapy services prior to discharge home (not in alignment with [facility], planned to d/c hospice)   Therapy Recommendations Upon DC: Dysphagia Training       Patient / Family Goals  Patient / Family Goal #1: \"I don't want her to be aspirating\" per daughter  Goal #1 Outcome: Progressing as expected  Short Term Goal # 1: Pt will participate in pre-feeding trials with SLP only with no overt s/sx of aspiration or decline in respiratory status  Goal Outcome # 1: Goal not met  Short Term Goal # 2: Patient will participate in swallow diagnostic to guide dysphagia management.  Goal Outcome # 2 : Goal met, new goal aded  Short Term Goal # 2 B : Family will identify three ways to reduce risk of PNA.  Short Term Goal # 3: Patient will consume diet of PU4/MT2 wihtout s/sx of aspiration.      Gemini Duran, SLP  "

## 2024-03-14 NOTE — DISCHARGE PLANNING
Case Management Discharge Planning    Admission Date: 3/10/2024  GMLOS: 2.9  ALOS: 4    6-Clicks ADL Score: 6  6-Clicks Mobility Score: 6  PT and/or OT Eval ordered: NA  Post-acute Referrals Ordered: NA  Post-acute Choice Obtained: NA  Has referral(s) been sent to post-acute provider:  RUBY      Anticipated Discharge Dispo: Discharge Disposition: D/T to hospice home (50)    DME Needed: No    Action(s) Taken: OTHER  This RN CM called [facility] and left voicemail with contact information for return call. Patient plan is to go home to [place] thru [facility].    1000 called [facility] no answer left VM with contact information for return call back.    1013 Called Daughter Radha no answer left VM with contact information for return call back.    1030 Received call back from [facility] Doris [phone] and they have accepted patient and will see her Monday 3/18 morning at the earliest. Per Doris we might need to place DME order but she was not sure.  Transport home is not covered thru hospice.    1035 called [employer] DME company per Liaison [facility] will need to place order for DME    1040 Called Doris back with [facility] ticketea and updated that DME order needs to come from them. Doris to call [employer] for DME set up.     1220 This RN CM followed up with Daughter Radha and family at bedside and discussed plan for home with hospice. Updated family that hospice nurse would be there Monday morning. Updated family on DME needs and delivery, and [facility] and afterwards it will not be covered. Family verbally agreed to plan     1315 received call from Doris with [facility], requesting DME delivery for tomorrow 3/15 in the morning. Updated Radha daughter at bedside to have her phone on her and ensure a family member is at the house to receive delivery.    1408 submitted request for rideline for tomorrow 3/15 at 1300,  pending confirmation.       Escalations Completed: None    Medically Clear: Yes    Next Steps: Follow up with Hospice on status for acceptance, and DME delivery.    Barriers to Discharge: None    Is the patient up for discharge tomorrow: No

## 2024-03-14 NOTE — PROGRESS NOTES
Hospital Medicine Daily Progress Note    Date of Service  3/13/2024    Chief Complaint  Soila Sanchez is a 89 y.o. female admitted 3/10/2024 with stroke    Hospital Course  Keyla Sanchez is a 89 y.o. female who presented 3/10/2024 with right sided paralysis.  Ms. Sanchez apparently has a past medical history of prior stroke and hypertension though details are unknown that was found by her daughter this morning with decreased level of consciousness and right-sided weakness.  Her last known well was 8 PM the night prior.  She is brought to the emergency room where CT of the head, CTA of the head and neck and CT perfusion are all negative for acute processes.  She is verbal though oriented only to name though not age nor time nor event.  She has a significant right hemiparesis.  Her blood pressure is elevated in the emergency room at 180/128.  She does not appear safe to swallow at this time and a high aspiration risk.  There are no friends nor family to corroborate her history present illness nor past medical history nor advanced directives.     I discussed the plan of care with Dr. Felder.  I have called her daughter Radha Rajan [phone] and left a message with her to call back.    Interval Problem Update  3/11 Patient is new to me today, she is in bed, RRT was called earlier today due to concern for patient worsening mental status, I have discussed with bedside nurse charge nurse RRT staff, evaluated patient at bedside, call neurology discussed case with Dr. SOLIS, we reviewed MRI of the brain along with radiology, patient with positive stroke, no bleeding, discussed with Dr. Solis regarding need for repeat CT but at this time he is recommended to continue on aspirin Plavix and statin PT OT and speech evaluation, unfortunately patient is not able to swallow safely, I have ordered per rectum aspirin, discussed with patient family regarding tube feedings, at this time patient family is going to discuss with the rest  of the family and let us know if patient will be okay to have NG tube, we discussed also regarding hospice and comfort care, all question have been answered, discussed with bedside nurse charge nurse .  3/12 patient is resting in bed, she was able to tell me her name, she does not know where she is at patient speaking in very soft voice and she gets tired quickly and falls asleep, patient evaluated by PT OT patient is still n.p.o. but recommended to start ice chips with one-to-one supervision, discussed with patient's daughter regarding plan of care, at this time they are still thinking that tube feeding is not what patient will like and they are leaning towards comfort care, hospice has been consulted, patient is still requiring IV labetalol and hydralazine to keep her blood pressure under control, will continue close monitoring, discussed with patient's daughter and family regarding continuing with blood work and at this time they would like to continue just current plan of care with gentle hydration and IV medications no escalating care no more blood work or testing, will follow-up with hospice, I have discussed with , charge nurse, bedside nurse all question have been answered.        Addendum: came to patient's room to discuss with patient's daughter regarding plan of care at this time patient's daughter would like to hold off on feeding tube placement, family members are coming today to visit patient and to discuss goals of care, family is considering comfort care, hospice has been consulted, I discussed with patient's daughter and  regarding comfort care, hospice, al questions answered. I spent extra 11 minutes.       Above per previous losses.      3/13/2024  Discussed with patient's daughter at bedside.  They are deciding on hospice with group home versus home.  The patient is requesting to be discharged to home.  She is alert and orient x 2.  Continue to be a n.p.o. with ice  chips only.      I have discussed this patient's plan of care and discharge plan at IDT rounds today with Case Management, Nursing, Nursing leadership, and other members of the IDT team.    Consultants/Specialty  neuro    Code Status  DNAR/DNI    Disposition  The patient is not medically cleared for discharge to home or a post-acute facility.  Anticipate discharge to: hospice    I have placed the appropriate orders for post-discharge needs.    Review of Systems  Review of Systems   Cardiovascular:  Negative for chest pain.   Musculoskeletal:  Negative for back pain, joint pain and myalgias.   Neurological:  Negative for headaches.        Physical Exam  Temp:  [36.5 °C (97.7 °F)-36.8 °C (98.2 °F)] 36.5 °C (97.7 °F)  Pulse:  [76-93] 93  Resp:  [16-17] 16  BP: (141-178)/() 148/90  SpO2:  [94 %-95 %] 95 %    Physical Exam  Vitals and nursing note reviewed. Exam conducted with a chaperone present.   Constitutional:       Appearance: She is normal weight. She is ill-appearing. She is not diaphoretic.   HENT:      Head: Normocephalic and atraumatic.      Mouth/Throat:      Mouth: Mucous membranes are moist.      Pharynx: Oropharynx is clear. No oropharyngeal exudate.   Eyes:      General:         Right eye: No discharge.         Left eye: No discharge.      Conjunctiva/sclera: Conjunctivae normal.      Pupils: Pupils are equal, round, and reactive to light.   Cardiovascular:      Rate and Rhythm: Normal rate and regular rhythm.      Pulses: Normal pulses.      Heart sounds: Normal heart sounds. No murmur heard.  Pulmonary:      Effort: Pulmonary effort is normal. No respiratory distress.      Breath sounds: Normal breath sounds.   Abdominal:      General: Abdomen is flat. Bowel sounds are normal. There is no distension.      Palpations: Abdomen is soft.      Tenderness: There is no abdominal tenderness.   Musculoskeletal:      Cervical back: Neck supple. No tenderness.      Right lower leg: No edema.      Left lower  leg: No edema.   Neurological:      Mental Status: She is alert. She is disoriented.      Motor: Weakness present.      Comments: Alert and orient x 2.  Following commands on the left side.  Right hemiplegia         Fluids    Intake/Output Summary (Last 24 hours) at 3/13/2024 2121  Last data filed at 3/13/2024 0428  Gross per 24 hour   Intake --   Output 1100 ml   Net -1100 ml       Laboratory                        Recent Labs     03/11/24  0054   TRIGLYCERIDE 148   HDL 67   *       Imaging  MR-BRAIN-W/O   Final Result         1. Age-related cerebral atrophy.      2. Extensive periventricular and pontine white matter changes consistent with chronic microvascular ischemic gliosis.      3. Acute bandlike area of acute infarction involving the posterior limb of the left internal capsule.      4. Multifocal areas of chronic lacunar type infarction involving the basal ganglia and right frontal periventricular white matter.      5. Marked chronic ectasia of the cavernous and supraclinoid internal carotid arteries as well as the proximal M1 segments and the distal vertebral basilar system.      DX-CHEST-PORTABLE (1 VIEW)   Final Result      Cardiomegaly.      CT-CTA NECK WITH & W/O-POST PROCESSING   Final Result      1.  Atherosclerotic plaque involving the carotid bifurcations bilaterally. This results in less than 50% diameter narrowing.      2.  Ectasia of the visualized intracranial portions of the vertebral, basilar and internal carotid arteries.      3.  No evidence of carotid or vertebral arterial occlusion or dissection.      CT-CTA HEAD WITH & W/O-POST PROCESS   Final Result      1.  No acute appearing abnormality detected. No large vessel occlusion detected.   2.  Atherosclerotic vascular disease of intracranial arteries which is detailed above. This is most notable for moderate stenoses in the right anterior cerebral artery.   3.  There is fusiform dilation of the left carotid terminus up to 6 mm in  diameter although there is no saccular aneurysm detected.      CT-CEREBRAL PERFUSION ANALYSIS   Final Result      No cerebral perfusion anomaly detected.      1.  Cerebral blood flow less than 30% likely representing completed infarct = 0 mL.      2.  T Max more than 6 seconds likely representing combination of completed infarct and ischemia = 0 mL.      3.  Mismatched volume likely representing ischemic brain/penumbra = None      4.  Please note that the cerebral perfusion was performed on the limited brain tissue around the basal ganglia region. Infarct/ischemia outside the CT perfusion sections can be missed in this study.      CT-HEAD W/O   Final Result      1.  No evidence of acute intracranial process.      2.  Cerebral atrophy as well as periventricular chronic small vessel ischemic change.              Assessment/Plan  * Ischemic stroke (HCC)- (present on admission)  Assessment & Plan  With right hemiparesis and no evidence of seizure to suggest Librado's Paralysis  CT, CTA, perfusion study negative  MRI brain ordered.  Refrain from antiplatelets given the risk of hemorrhagic conversion  Therapies ordered  Tele monitoring to eval for afib  Await a call back from her daughter to discuss history and advanced directives and tube feeding.  Neurology consult once her daughter has been contacted to discuss her wishes.  Prognosis is poor given the significant deficits and advanced age.    MRI of the brain is reviewed, discussed with radiology discussed with neurology team, had discussion with patient's family regarding imaging and prognosis, neurologist recommending aspirin and Plavix and statin, patient unfortunately is not safe to swallow, discussed with patient's family regarding tube feeding, as per patient's daughter she is going to call rest of the family and discuss case they are considering comfort care/hospice.      03/13/24  Patient's family presenting with hospice for home versus group home.  Continue to be  n.p.o. for ice chips  Discussed the risk of aspiration with meeting with the patient's family at bedside.  Once proceeding with comfort care measures and hospice, we will allow the patient to eat in anticipation that the patient will aspirate.  Speech therapy can recommend diet to minimize this risk.      Right hemiplegia (HCC)- (present on admission)  Assessment & Plan  Secondary to stroke.    Fall precautions    Encounter for hospice care- (present on admission)  Assessment & Plan  03/13/24  Patient's family requesting transition to hospice upon discharge.  Case management assisting for placement.    Hypertensive urgency  Assessment & Plan  Discussed with neuro no indication for permissive htn  I have ordered iv hydralazine and labetalol, monitoring for side effects like hypotension bradycardia  Discussed with bedside nurse.     Patient is still requiring iv labetalol and hydralazine to control her hypertension, bp 202/118  Monitoring for side effects including but not limited to hypotension / bradycardia.     Hypokalemia- (present on admission)  Assessment & Plan  Potassium is low at 3.2 and will be added to IV fluids  Check BMP in the morning    03/13/24  Continuing potassium infusion         VTE prophylaxis: scd    I have performed a physical exam and reviewed and updated ROS and Plan today (3/13/2024). In review of yesterday's note (3/12/2024), there are no changes except as documented above.

## 2024-03-14 NOTE — THERAPY
"Speech Language Pathology   Daily Treatment     Patient Name: Soila Sanchez  AGE:  89 y.o., SEX:  female  Medical Record #: [id number]  Date of Service: 3/14/2024      Precautions:  Precautions: Fall Risk, Swallow Precautions         Subjective  Patient seen this date for dysphagia management. Patient alert, agreeable to session, and positioned to upright in bed. \"Sure\" to PO.       Assessment  Per chart, patient going hospice and clear per MD to eat despite risk. PO presentations of thin liquids (TN0) cup, liquidized solids (LQ3), minced and moist solids (MM5), regular solids (RG7), and thin liquids (TN0) tsp. Prolonged AP transfer vs delayed swallow initiation of TN0. Adequate oral bolus acceptance; occasional R anterior bolus loss of TN0. No bite of RG7, poor mastication of small piece of RG7. Prolonged mastication of MM5. Minimal oral bolus residue cleared with liquid wash initiated by clinician.cough x1 tsp and x1 cup sip TN0. Vocal quality remained stable throughout PO intake. Single swallow completed per bolus. No signs of esophageal dysfunction. !:1 feeding assistance r/t R sided deficits.     Called patient's daughter to discuss diet initiation. Patient's daughter expressed concerns for aspiration. Discussed eating despite risk with hospice discharge. Daughter expressed preference for swallow diagnostic to guide diet recommendation/decision making. FEES procedure described in detail. MD/RN updated.       Clinical Impressions  Patient presents with clinical indicators of airway invasion, concerning for pharyngeal dysphagia. Presentation is consistent with stroke and current mentation. Although patient is planned to d/c on hospice, a swallowing diagnostic is indicated to guide dysphagia management in alignment with patient/family goals/preferences. Recommend NPO with meds crushed in applesauce and ice chips for pleasure in interim. FEES scheduled for 1100 this date.        Recommendations  Diet Consistency: " "NPO pending diagnostic; Clear for minimal ice chips/hour after oral care to reduce xerostomia and mitigate disuse atrophy of swallow musculature   Instrumentation: FEES  Medication: Non Oral, Crush with applesauce, as appropriate  Oral Care: Q4h                     SLP Treatment Plan  Treatment Plan: Dysphagia Treatment  SLP Frequency: 3x Per Week  Estimated Duration: Until Therapy Goals Met      Anticipated Discharge Needs  Discharge Recommendations: Recommend post-acute placement for additional speech therapy services prior to discharge home  Therapy Recommendations Upon DC: Dysphagia Training      Patient / Family Goals  Patient / Family Goal #1: \"I don't want her to be aspirating\" per daughter  Short Term Goals  Short Term Goal # 1: Pt will participate in pre-feeding trials with SLP only with no overt s/sx of aspiration or decline in respiratory status  Goal Outcome # 1: Progressing slower than expected  Short Term Goal # 2: Patient will participate in swallow diagnostic to guide dysphagia management.      Gemini Duran, SLP  "

## 2024-03-15 VITALS
WEIGHT: 132.28 LBS | HEART RATE: 89 BPM | BODY MASS INDEX: 22.04 KG/M2 | OXYGEN SATURATION: 94 % | TEMPERATURE: 97.9 F | HEIGHT: 65 IN | DIASTOLIC BLOOD PRESSURE: 75 MMHG | RESPIRATION RATE: 17 BRPM | SYSTOLIC BLOOD PRESSURE: 121 MMHG

## 2024-03-15 PROBLEM — E87.6 HYPOKALEMIA: Status: RESOLVED | Noted: 2024-03-10 | Resolved: 2024-03-15

## 2024-03-15 PROBLEM — I63.512 ACUTE ISCHEMIC LEFT MIDDLE CEREBRAL ARTERY (MCA) STROKE (HCC): Status: ACTIVE | Noted: 2024-03-10

## 2024-03-15 PROBLEM — I16.0 HYPERTENSIVE URGENCY: Status: RESOLVED | Noted: 2024-03-11 | Resolved: 2024-03-15

## 2024-03-15 PROCEDURE — 99239 HOSP IP/OBS DSCHRG MGMT >30: CPT | Performed by: STUDENT IN AN ORGANIZED HEALTH CARE EDUCATION/TRAINING PROGRAM

## 2024-03-15 PROCEDURE — 700111 HCHG RX REV CODE 636 W/ 250 OVERRIDE (IP): Performed by: HOSPITALIST

## 2024-03-15 PROCEDURE — A9270 NON-COVERED ITEM OR SERVICE: HCPCS | Performed by: HOSPITALIST

## 2024-03-15 PROCEDURE — 700102 HCHG RX REV CODE 250 W/ 637 OVERRIDE(OP): Performed by: HOSPITALIST

## 2024-03-15 RX ORDER — AMLODIPINE BESYLATE 5 MG/1
5 TABLET ORAL DAILY
Qty: 30 TABLET | Refills: 1 | Status: CANCELLED | OUTPATIENT
Start: 2024-03-16

## 2024-03-15 RX ADMIN — HYDRALAZINE HYDROCHLORIDE 10 MG: 20 INJECTION, SOLUTION INTRAMUSCULAR; INTRAVENOUS at 00:56

## 2024-03-15 RX ADMIN — ASPIRIN 300 MG: 300 SUPPOSITORY RECTAL at 05:57

## 2024-03-15 NOTE — PROGRESS NOTES
Pt 3 daughters at bedside (Radha Mary, Angelica)  Pt given IV PRN Labetalol for /113  Pt took amlodipine 5mg tablet in pureed peaches and daughters feeding pt the remainder of peaches  Pt did not respond verbally

## 2024-03-15 NOTE — PROGRESS NOTES
Hospital Medicine Daily Progress Note    Date of Service  3/14/2024    Chief Complaint  Soila Sanchez is a 89 y.o. female admitted 3/10/2024 with stroke    Hospital Course  Keyla Sanchez is a 89 y.o. female who presented 3/10/2024 with right sided paralysis.  Ms. Sanchez apparently has a past medical history of prior stroke and hypertension though details are unknown that was found by her daughter this morning with decreased level of consciousness and right-sided weakness.  Her last known well was 8 PM the night prior.  She is brought to the emergency room where CT of the head, CTA of the head and neck and CT perfusion are all negative for acute processes.  She is verbal though oriented only to name though not age nor time nor event.  She has a significant right hemiparesis.  Her blood pressure is elevated in the emergency room at 180/128.  She does not appear safe to swallow at this time and a high aspiration risk.  There are no friends nor family to corroborate her history present illness nor past medical history nor advanced directives.     I discussed the plan of care with Dr. Felder.  I have called her daughter Radha Rajan [phone] and left a message with her to call back.    Interval Problem Update  3/11 Patient is new to me today, she is in bed, RRT was called earlier today due to concern for patient worsening mental status, I have discussed with bedside nurse charge nurse RRT staff, evaluated patient at bedside, call neurology discussed case with Dr. SOLIS, we reviewed MRI of the brain along with radiology, patient with positive stroke, no bleeding, discussed with Dr. Solis regarding need for repeat CT but at this time he is recommended to continue on aspirin Plavix and statin PT OT and speech evaluation, unfortunately patient is not able to swallow safely, I have ordered per rectum aspirin, discussed with patient family regarding tube feedings, at this time patient family is going to discuss with the rest  of the family and let us know if patient will be okay to have NG tube, we discussed also regarding hospice and comfort care, all question have been answered, discussed with bedside nurse charge nurse .  3/12 patient is resting in bed, she was able to tell me her name, she does not know where she is at patient speaking in very soft voice and she gets tired quickly and falls asleep, patient evaluated by PT OT patient is still n.p.o. but recommended to start ice chips with one-to-one supervision, discussed with patient's daughter regarding plan of care, at this time they are still thinking that tube feeding is not what patient will like and they are leaning towards comfort care, hospice has been consulted, patient is still requiring IV labetalol and hydralazine to keep her blood pressure under control, will continue close monitoring, discussed with patient's daughter and family regarding continuing with blood work and at this time they would like to continue just current plan of care with gentle hydration and IV medications no escalating care no more blood work or testing, will follow-up with hospice, I have discussed with , charge nurse, bedside nurse all question have been answered.        Addendum: came to patient's room to discuss with patient's daughter regarding plan of care at this time patient's daughter would like to hold off on feeding tube placement, family members are coming today to visit patient and to discuss goals of care, family is considering comfort care, hospice has been consulted, I discussed with patient's daughter and  regarding comfort care, hospice, al questions answered. I spent extra 11 minutes.       Above per previous losses.      3/13/2024  Discussed with patient's daughter at bedside.  They are deciding on hospice with group home versus home.  The patient is requesting to be discharged to home.  She is alert and orient x 2.  Continue to be a n.p.o. with ice  chips only.    3/14/2024  Discussed with patient's family at bedside.  Hospice agency and case management working on DME for home.  Patient is minimally verbally responsive.  Discussed with speech therapy.  Proceed with a FEES study.  Diet was advanced.      I have discussed this patient's plan of care and discharge plan at IDT rounds today with Case Management, Nursing, Nursing leadership, and other members of the IDT team.    Consultants/Specialty  neuro    Code Status  DNAR/DNI    Disposition  The patient is not medically cleared for discharge to home or a post-acute facility.  Anticipate discharge to: hospice    I have placed the appropriate orders for post-discharge needs.    Review of Systems  Review of Systems   Unable to perform ROS: Patient nonverbal        Physical Exam  Temp:  [36.5 °C (97.7 °F)-37.1 °C (98.8 °F)] 36.9 °C (98.4 °F)  Pulse:  [86-98] 98  Resp:  [16-17] 16  BP: (148-185)/() 185/110  SpO2:  [95 %-96 %] 96 %    Physical Exam  Vitals and nursing note reviewed. Exam conducted with a chaperone present.   Constitutional:       Appearance: She is normal weight. She is ill-appearing. She is not diaphoretic.   HENT:      Head: Normocephalic and atraumatic.      Mouth/Throat:      Mouth: Mucous membranes are moist.      Pharynx: Oropharynx is clear. No oropharyngeal exudate.   Eyes:      General:         Right eye: No discharge.         Left eye: No discharge.      Conjunctiva/sclera: Conjunctivae normal.      Pupils: Pupils are equal, round, and reactive to light.   Cardiovascular:      Rate and Rhythm: Normal rate and regular rhythm.      Pulses: Normal pulses.      Heart sounds: Normal heart sounds. No murmur heard.  Pulmonary:      Effort: Pulmonary effort is normal. No respiratory distress.      Breath sounds: Normal breath sounds.   Abdominal:      General: Abdomen is flat. Bowel sounds are normal. There is no distension.      Palpations: Abdomen is soft.      Tenderness: There is no  abdominal tenderness.   Musculoskeletal:      Cervical back: Neck supple. No tenderness.      Right lower leg: No edema.      Left lower leg: No edema.   Neurological:      Mental Status: She is alert. She is disoriented.      Motor: Weakness present.      Comments: Right hemiplegia.  Not following commands.  Minimal verbal responsiveness.         Fluids    Intake/Output Summary (Last 24 hours) at 3/14/2024 1852  Last data filed at 3/14/2024 1755  Gross per 24 hour   Intake 0 ml   Output 1450 ml   Net -1450 ml       Laboratory                                Imaging  MR-BRAIN-W/O   Final Result         1. Age-related cerebral atrophy.      2. Extensive periventricular and pontine white matter changes consistent with chronic microvascular ischemic gliosis.      3. Acute bandlike area of acute infarction involving the posterior limb of the left internal capsule.      4. Multifocal areas of chronic lacunar type infarction involving the basal ganglia and right frontal periventricular white matter.      5. Marked chronic ectasia of the cavernous and supraclinoid internal carotid arteries as well as the proximal M1 segments and the distal vertebral basilar system.      DX-CHEST-PORTABLE (1 VIEW)   Final Result      Cardiomegaly.      CT-CTA NECK WITH & W/O-POST PROCESSING   Final Result      1.  Atherosclerotic plaque involving the carotid bifurcations bilaterally. This results in less than 50% diameter narrowing.      2.  Ectasia of the visualized intracranial portions of the vertebral, basilar and internal carotid arteries.      3.  No evidence of carotid or vertebral arterial occlusion or dissection.      CT-CTA HEAD WITH & W/O-POST PROCESS   Final Result      1.  No acute appearing abnormality detected. No large vessel occlusion detected.   2.  Atherosclerotic vascular disease of intracranial arteries which is detailed above. This is most notable for moderate stenoses in the right anterior cerebral artery.   3.  There  is fusiform dilation of the left carotid terminus up to 6 mm in diameter although there is no saccular aneurysm detected.      CT-CEREBRAL PERFUSION ANALYSIS   Final Result      No cerebral perfusion anomaly detected.      1.  Cerebral blood flow less than 30% likely representing completed infarct = 0 mL.      2.  T Max more than 6 seconds likely representing combination of completed infarct and ischemia = 0 mL.      3.  Mismatched volume likely representing ischemic brain/penumbra = None      4.  Please note that the cerebral perfusion was performed on the limited brain tissue around the basal ganglia region. Infarct/ischemia outside the CT perfusion sections can be missed in this study.      CT-HEAD W/O   Final Result      1.  No evidence of acute intracranial process.      2.  Cerebral atrophy as well as periventricular chronic small vessel ischemic change.              Assessment/Plan  * Ischemic stroke (HCC)- (present on admission)  Assessment & Plan  With right hemiparesis and no evidence of seizure to suggest Librado's Paralysis  CT, CTA, perfusion study negative  MRI brain ordered.  Refrain from antiplatelets given the risk of hemorrhagic conversion  Therapies ordered  Tele monitoring to eval for afib  Await a call back from her daughter to discuss history and advanced directives and tube feeding.  Neurology consult once her daughter has been contacted to discuss her wishes.  Prognosis is poor given the significant deficits and advanced age.    MRI of the brain is reviewed, discussed with radiology discussed with neurology team, had discussion with patient's family regarding imaging and prognosis, neurologist recommending aspirin and Plavix and statin, patient unfortunately is not safe to swallow, discussed with patient's family regarding tube feeding, as per patient's daughter she is going to call rest of the family and discuss case they are considering comfort care/hospice.      03/13/24  Patient's family  presenting with hospice for home versus group home.  Continue to be n.p.o. for ice chips  Discussed the risk of aspiration with meeting with the patient's family at bedside.  Once proceeding with comfort care measures and hospice, we will allow the patient to eat in anticipation that the patient will aspirate.  Speech therapy can recommend diet to minimize this risk.    03/14/24  Diet advanced following F EES study.      Right hemiplegia (HCC)- (present on admission)  Assessment & Plan  Secondary to stroke.    Fall precautions    Oropharyngeal dysphagia- (present on admission)  Assessment & Plan  03/14/24  Secondary to acute stroke  The study completed by speech therapy.   Diet was advanced.  Discussed with speech therapy.    Encounter for hospice care- (present on admission)  Assessment & Plan  03/13/24  Patient's family requesting transition to hospice upon discharge.  Case management assisting for placement.    Hypertensive urgency- (present on admission)  Assessment & Plan  Discussed with neuro no indication for permissive htn  I have ordered iv hydralazine and labetalol, monitoring for side effects like hypotension bradycardia  Discussed with bedside nurse.     Patient is still requiring iv labetalol and hydralazine to control her hypertension, bp 202/118  Monitoring for side effects including but not limited to hypotension / bradycardia.     03/14/24  Continuing elevation in blood pressures.  Resume amlodipine 5 mg daily    Hypokalemia- (present on admission)  Assessment & Plan  Potassium is low at 3.2 and will be added to IV fluids  Check BMP in the morning    03/13/24  Continuing potassium infusion    03/14/24  Discontinue potassium supplementation         VTE prophylaxis: scd    I have performed a physical exam and reviewed and updated ROS and Plan today (3/14/2024). In review of yesterday's note (3/13/2024), there are no changes except as documented above.

## 2024-03-15 NOTE — DISCHARGE PLANNING
Case Management Discharge Planning    Admission Date: 3/10/2024  GMLOS: 2.9  ALOS: 5    6-Clicks ADL Score: 6  6-Clicks Mobility Score: 6  PT and/or OT Eval ordered: NA  Post-acute Referrals Ordered: NA  Post-acute Choice Obtained: NA  Has referral(s) been sent to post-acute provider:  RUBY      Anticipated Discharge Dispo: Discharge Disposition: D/T to hospice home (50)    DME Needed: Yes    DME Ordered: Yes    Action(s) Taken: OTHER    This RN CM followed up with [facility] DME and hospital bed and bedside table have been delivered to patients' address. Transportation confirmed for 1300. Hospice RN to see patient on Monday 3/18.    1232 This RN CM followed up with bedside RN and provided 2 face to face sheets for [facility], Dr. Arambula had already provided scrip to charge RN for DNAR/DNI for [facility]. Talked with daughter Radha at bedside to check in to see if she had any concerns.     Escalations Completed: None    Medically Clear: Yes    Next Steps: Follow up with daughter Radha to update on transport time.     Barriers to Discharge: DME and Transportation    Is the patient up for discharge tomorrow: No Today

## 2024-03-15 NOTE — CARE PLAN
The patient is Stable - Low risk of patient condition declining or worsening    Shift Goals  Clinical Goals: D/C home with Hospice  Patient Goals: ALLEN  Family Goals: pt comfort    Progress made toward(s) clinical / shift goals:  Patient discharging home with Hospice on [place]. Family will be waiting at home for patient.

## 2024-03-15 NOTE — ASSESSMENT & PLAN NOTE
03/14/24  Secondary to acute stroke  The study completed by speech therapy.   Diet was advanced.  Discussed with speech therapy.

## 2024-03-15 NOTE — CARE PLAN
The patient is Stable - Low risk of patient condition declining or worsening    Shift Goals  Clinical Goals: Neuro stability; hemodynamic stability  Patient Goals: alexandre  Family Goals: pt comfort    Progress made toward(s) clinical / shift goals:      Problem: Optimal Care of the Stroke Patient  Goal: Optimal acute care for the stroke patient  Description: Target End Date:  1 to 3 days    - Vital signs and neuro checks performed and documented per order  - NIHSS completed and documented per order  - Continuous telemetry monitoring for 72 hours or until discontinued by provider  - Head CT without contrast obtained  - Consideration of MRI/MRA  - MRI screening form completed in worklist if MRI ordered  - Echocardiogram with Bubble Study ordered/completed with consideration of CANDICE  - Carotid Doppler ordered/completed (Not required if CTA of neck completed in ED)  - Lipid Panel obtained within 48 hours of admission  - PT, PTT, INR obtained per Anticoagulation orders (if applicable)  - Antithrombotic therapy by end of hospital day 2 for ischemic stroke. Provider must document reason if contraindicated.  - Venous Thromboembolism (VTE) Prophylaxis by end of hospital day 2 for ischemic and hemorrhagic stroke. Provider must document reason if contraindicated  - Dysphagia screen completed and documented prior to any PO intake. Patient to remain NPO until Speech Therapy evaluation if thrombolytic or thrombectomy performed  - Rehabilitation assessment including PT/OT/SLP evaluations for referral to Physical Medicine and Rehabilitation services. If none needed, provider needs to document reason  - Neurology consult placed  - Consideration of cardiology consult for cryptogenic strokes  Outcome: Progressing  Flowsheets (Taken 3/15/2024 7953)  Acute Care Order Review: Reviewed orders for compliance with Stroke Protocol  Note: Stroke protocols ordered and in place     Problem: Hemodynamic Monitoring  Goal: Patient's hemodynamics, fluid  balance and neurologic status will be stable or improve  Description: Target End Date:  Prior to discharge or change in level of care    1.  Vital signs, pulse oximetry and cardiac monitor per provider order and/or policy  2.  Frequent pulse checks performed post thrombectomy  3.  Frequent monitoring for signs of bleeding post TPA administration  4.  Proper management of IV infusions  5.  Intake and output monitored per provider order  6.  Daily weight obtained per unit policy or provider order  7.  Peripheral pulses and capillary refill assessed as needed  8.  Monitor for signs/symptoms of excessive bleeding  9.  Body temperature assessed and fevers treated  10. Patient positioned for maximum circulation/cardiac output  Outcome: Progressing  Note: After a dip in blood pressure pt blood pressure WDL     Problem: Respiratory - Stroke Patient  Goal: Patient will achieve/maintain optimum respiratory rate/effort  Description: Target End Date:  Prior to discharge or change in level of care    Document on Assessment flowsheet    1.  Assess and monitor respiratory rate, rhythm, depth and effort of respiration  2.  Oxygenation assessed throughout shift (recommendation of >94% for new stroke patients)  3.  Oxygen administered and/or titrated per order  4.  Collaboration with RT to administer medication/treatments per order  5.  Patient educated on importance of turning, coughing, and deep breathing  6.  Patient positioned for maximum ventilatory efficiency  7.  Airway suctioning provided as needed  8.  Incentive spirometry encouraged 5-10 times every hour or while awake  Outcome: Progressing  Flowsheets (Taken 3/15/2024 3714)  Head of Bed Elevated: Less than 30 degrees  O2 Delivery Device: None - Room Air  Note: Pt maintaining oxygenation an effective respirations